# Patient Record
(demographics unavailable — no encounter records)

---

## 2024-10-08 NOTE — PHYSICAL EXAM
[Fully active, able to carry on all pre-disease performance without restriction] : Status 0 - Fully active, able to carry on all pre-disease performance without restriction [Normal] : affect appropriate [de-identified] : exam deferred [de-identified] : decreased sensation to fintertips and tops of tips of toes

## 2024-10-08 NOTE — PHYSICAL EXAM
[Fully active, able to carry on all pre-disease performance without restriction] : Status 0 - Fully active, able to carry on all pre-disease performance without restriction [Normal] : affect appropriate [de-identified] : exam deferred [de-identified] : decreased sensation to fintertips and tops of tips of toes

## 2024-10-08 NOTE — ASSESSMENT
[FreeTextEntry1] : #Breast Cancer  - IDC - ER positive at 60%, DE positive at 80%, HER2 negative by IHC with a Ki-67 of 30%.  - Reviewed the diagnosis, prognosis and natural history of ER/DE+, HER2 - IDC - Reviewed the imaging and path - Reviewed the NCCN guidelines and patient expresses understanding - Oncotype 21 - Since she is < 50  discussed that there is a ~6.5% benefit for RS 21-25. She wishes to proceed with chemotherapy - Discussed dd AC-->T vs TC - Plan for AC-->T - Echo -Left ventricular cavity is normal in size. Left ventricular wall thickness is normal. Left ventricular systolic function is normal with an ejection fraction visually estimated at 55 to 60 %. There are no regional wall motion abnormalities seen. - vs and labs reviewed. labs drawn in office today. cbc wnl, cmet wnl. ok to proceed with cycle 1 of AC with onpro she is going to do cold capping which she has.  EMLA and zofran ordered PRN - 6/12/24 vs and CBC reviewed; WBC 10.18, hgb 14.4, plt 238, ANC wnl. LFTs wnl. s/p C1 ddAC. Tolerated overall well. Proceed with C2 today  - 6/26/24 vs and CBC reviewed; WBC 15.89, hgb 12.5, plt 211. s/p c2 ddAC, proceed with C3 today. Of note MammaPrint returned as  high risk 1 luminal type B breast cancer. This goes along with her Oncotype recurrence score of 21.  - 7/10/24 - vs and labs reviewed. labs drawn in office today. wbc, hgb and plts wnl. kidney function, liver function, electrolytes. proceed with ddAC - 4th cycle. to start taxol in 2 weeks. steroids ordered. continue with dignicaps - 7/24/24- vs and labs reviewed. labs drawn in office today. wbc 20, hgb and plts wnl. wbc elevated likely due to steroids. monitor. elevated sugars due to steroids - advised to let endo know. ok to proceed with cycle 1 of taxol. - 7/31/24 vs and CBC reviewed; WBC 7.07, hgb 12.1, plt 281 ANC wnl. ALT 34. s/p C1 weekly taxol. Proceed with C2 today. Glucose and WBC more controlled. IF she tolerates this cycle without reaction consider lowering dex to 2 tabs night before.  - 8/7/24 vs and CBC reviewed; WBC 6.25 hgb 12.2, plt 353. LFTs wnl. Mg wnl. Proceed with C3 weekly taxol today. Will lower next dose night before dex to 2 tabs since she is tolerating well and did not have rxn  - 8/14/24 vs and labs reviewed; Patient went directly to infusion and treatment was started without seeing provider. Reviewed labs and were okay to proceed with tx. Discussed with Infusion RN Latonia and Ann Infusion NP the flow of this. Discussed with patient that she needs to see provider prior to start of treatment. +myalgias and pain worse in the back numbness to legs no saddle anesthesia dificulty walking. She got C4 taxol today. Will get spinal imaging. MR [facility]UCEDO spine ordered.  - 8/21/24 vs and labs reviewed; WBC 7.69, hgb 13.2, plt 324 ANC 7.09. Proceed with C5 today. LFTs wnl. MR L spine for back pain and myalgias (worse to back) did not do yet. Expedited auth request sent to auth team  8/28/24 - vs and labs reviewed. wbc, hgb and plts wnl. proceed with C6 today, pending MRI L spine report - had it done yesterday. Will need to meet with Rad onc as she is finishing chemo - 9/25/24 vs and labs reviewed; See chart notes. arabella has been going directly to infusin to start tx prior to seeing us. Discussed with patient and infusion managers. Seen in office today. C10 weekly taxol. +neuropathy. I had a lengthy discussion with patient regarding cumulative effect of chemo induced neuropathy. she is currently on gabapentin and percocet (for back pain). She states the neuropathy is the same and she wants to continue. I reviewed with her grading of neuropathy and if we need to hold tx or last 2 cycles. She states this is okay today and wishes to continue. Will proceed with caution. Patient did not want to increase gabapentin. Can try cymbalta as well. Cold soaks. Tahmina cold gloves and socks. Rad onc appt 10/2  #Neuropathy  - Bought socks and gloves (cold) to wear during tx  - Cold soaks  - Tolerable and mild right now but if worsening discussed gabapentin in future. Wants to try. Will start at night 2 tabs as this is worse at night for her  - 8/14/24 Gabapentin 200mg in am afternoon and night, tylenol didnot work will try tramadol and get spinal imaging as above  - 8/21/24 gabapentin has been incrased to 200mg TID. Tylenol no relief. Added Tramadol. States she gets more relief with Gabapentin  8/28/24 - increased gabapentin to 300 mg TID. If needed can add cymbalta  #DM - Metformin - Ozempic - Has not been taking steroids night before   #HTN - Olmesartan, hctz, amlodipine, metoprolol - Monitor at home  #vit D deficiency - vit D 49100 IU  #Nausea  - Compazine prn   RTC in 1 weeks with cbc with diff, cmp, LDH, ESR, CRP with C11   case and management discussed with Dr. Sanches

## 2024-10-08 NOTE — HISTORY OF PRESENT ILLNESS
[de-identified] : Ms. Mak is a 45-year-old premenopausal female that presents for initial consultation for newly diagnosed DCIS.   Oncological History:   3/1/24 s/p screening mammography and ultrasound at [facility] which showed a new right breast 2:00 density measuring about 1.4 cm and 10 cm from the nipple. There are also some benign-appearing cyst seen in the left breast 10:00 region.  3/18/24 s/p diagnostic imaging and then a right breast ultrasound-guided core biopsy was performed which showed an intermediate grade invasive duct cancer which was ER positive at 60%, MD positive at 80%, HER2 negative by IHC with a Ki-67 of 30%.   [facility] breast, ovarian, colon cancer panel was negative for any mutation.   24 s/p bilateral breast MRI at [facility] showing the localized cancer in the right breast 2:00 region 9 cm from the nipple with some surrounding non-mass like enhancement spanning 2.2 cm in the AP dimension.   24 s/p right breast partial mastectomy with localization device placement and sentinel lymph node biopsy. The final pathology showed a 1.8 cm high-grade invasive duct cancer which remained ER/MD positive HER2/juan negative with a Ki-67 between 40 and 50% and she had 2 negative sentinel lymph nodes and free margins making this a pathologic prognostic stage IA breast cancer.  Oncotype pending  Breast Cancer Risk Factors: Menarche: 9 Date of LMP: has not had menses in 15 y ears bc of hormonal IUD  Menopause:   Age at first live birth: 27 Nursed: yes Hysterectomy: no  Oophorectomy: no OCP:  She does have an IUD in place hormonal getting switched to copper next month with Dr. Arriaga HRT: no Related family history   She has a family history of breast cancer with her maternal grandmother who had breast cancer in her 50s and then a recurrence in her 70s. Her paternal grandmother had breast cancer in her 80s and also had lung cancer. She has a maternal aunt who had breast cancer in her 50s had and then cervical clinical cancer in her 60s. She has a paternal aunt with breast cancer at age 45. Ashkenazi: no BRCA testing: negative   Former smoker wuit 10 years ago smoked for 10 years     [de-identified] : Patient seen and examined in infusion today for follow up. s/p C4 ddAC. Due for C10 weekly taxol. s/p MRI back negative. Numb to fingertips and toes. Wants to finish. +gabapentin and percocet. Doing quynh cap.Rad onc appt 10/2

## 2024-10-08 NOTE — REVIEW OF SYSTEMS
[Fever] : no fever [Chills] : no chills [Night Sweats] : no night sweats [Fatigue] : fatigue [Recent Change In Weight] : ~T no recent weight change [Diarrhea: Grade 1 - Increase of <4 stools per day over baseline; mild increase in ostomy output compared to baseline] : Diarrhea: Grade 1 - Increase of <4 stools per day over baseline; mild increase in ostomy output compared to baseline [Joint Pain] : joint pain [Confused] : no confusion [Dizziness] : no dizziness [Difficulty Walking] : difficulty walking [Negative] : Allergic/Immunologic [FreeTextEntry2] : weight stable  [de-identified] : 1 episode [de-identified] : +neuropathy

## 2024-10-08 NOTE — ASSESSMENT
[FreeTextEntry1] : #Breast Cancer  - IDC - ER positive at 60%, KY positive at 80%, HER2 negative by IHC with a Ki-67 of 30%.  - Reviewed the diagnosis, prognosis and natural history of ER/KY+, HER2 - IDC - Reviewed the imaging and path - Reviewed the NCCN guidelines and patient expresses understanding - Oncotype 21 - Since she is < 50  discussed that there is a ~6.5% benefit for RS 21-25. She wishes to proceed with chemotherapy - Discussed dd AC-->T vs TC - Plan for AC-->T - Echo -Left ventricular cavity is normal in size. Left ventricular wall thickness is normal. Left ventricular systolic function is normal with an ejection fraction visually estimated at 55 to 60 %. There are no regional wall motion abnormalities seen. - vs and labs reviewed. labs drawn in office today. cbc wnl, cmet wnl. ok to proceed with cycle 1 of AC with onpro she is going to do cold capping which she has.  EMLA and zofran ordered PRN - 6/12/24 vs and CBC reviewed; WBC 10.18, hgb 14.4, plt 238, ANC wnl. LFTs wnl. s/p C1 ddAC. Tolerated overall well. Proceed with C2 today  - 6/26/24 vs and CBC reviewed; WBC 15.89, hgb 12.5, plt 211. s/p c2 ddAC, proceed with C3 today. Of note MammaPrint returned as  high risk 1 luminal type B breast cancer. This goes along with her Oncotype recurrence score of 21.  - 7/10/24 - vs and labs reviewed. labs drawn in office today. wbc, hgb and plts wnl. kidney function, liver function, electrolytes. proceed with ddAC - 4th cycle. to start taxol in 2 weeks. steroids ordered. continue with dignicaps - 7/24/24- vs and labs reviewed. labs drawn in office today. wbc 20, hgb and plts wnl. wbc elevated likely due to steroids. monitor. elevated sugars due to steroids - advised to let endo know. ok to proceed with cycle 1 of taxol. - 7/31/24 vs and CBC reviewed; WBC 7.07, hgb 12.1, plt 281 ANC wnl. ALT 34. s/p C1 weekly taxol. Proceed with C2 today. Glucose and WBC more controlled. IF she tolerates this cycle without reaction consider lowering dex to 2 tabs night before.  - 8/7/24 vs and CBC reviewed; WBC 6.25 hgb 12.2, plt 353. LFTs wnl. Mg wnl. Proceed with C3 weekly taxol today. Will lower next dose night before dex to 2 tabs since she is tolerating well and did not have rxn  - 8/14/24 vs and labs reviewed; Patient went directly to infusion and treatment was started without seeing provider. Reviewed labs and were okay to proceed with tx. Discussed with Infusion RN Latonia and Ann Infusion NP the flow of this. Discussed with patient that she needs to see provider prior to start of treatment. +myalgias and pain worse in the back numbness to legs no saddle anesthesia dificulty walking. She got C4 taxol today. Will get spinal imaging. MR [facility] spine ordered.  - 8/21/24 vs and labs reviewed; WBC 7.69, hgb 13.2, plt 324 ANC 7.09. Proceed with C5 today. LFTs wnl. MR L spine for back pain and myalgias (worse to back) did not do yet. Expedited auth request sent to auth team  8/28/24 - vs and labs reviewed. wbc, hgb and plts wnl. proceed with C6 today, pending MRI L spine report - had it done yesterday. Will need to meet with Rad onc as she is finishing chemo - 9/25/24 vs and labs reviewed; See chart notes. arabella has been going directly to infusin to start tx prior to seeing us. Discussed with patient and infusion managers. Seen in office today. C10 weekly taxol. +neuropathy. I had a lengthy discussion with patient regarding cumulative effect of chemo induced neuropathy. she is currently on gabapentin and percocet (for back pain). She states the neuropathy is the same and she wants to continue. I reviewed with her grading of neuropathy and if we need to hold tx or last 2 cycles. She states this is okay today and wishes to continue. Will proceed with caution. Patient did not want to increase gabapentin. Can try cymbalta as well. Cold soaks. [place]mina cold gloves and socks. Rad onc appt 10/2  #Neuropathy  - Bought socks and gloves (cold) to wear during tx  - Cold soaks  - Tolerable and mild right now but if worsening discussed gabapentin in future. Wants to try. Will start at night 2 tabs as this is worse at night for her  - 8/14/24 Gabapentin 200mg in am afternoon and night, tylenol didnot work will try tramadol and get spinal imaging as above  - 8/21/24 gabapentin has been incrased to 200mg TID. Tylenol no relief. Added Tramadol. States she gets more relief with Gabapentin  8/28/24 - increased gabapentin to 300 mg TID. If needed can add cymbalta  #DM - Metformin - Ozempic - Has not been taking steroids night before   #HTN - Olmesartan, hctz, amlodipine, metoprolol - Monitor at home  #vit D deficiency - vit D 56192 IU  #Nausea  - Compazine prn   RTC in 1 weeks with cbc with diff, cmp, LDH, ESR, CRP with C11   case and management discussed with Dr. Sanches

## 2024-10-08 NOTE — REVIEW OF SYSTEMS
[Fever] : no fever [Chills] : no chills [Night Sweats] : no night sweats [Fatigue] : fatigue [Recent Change In Weight] : ~T no recent weight change [Diarrhea: Grade 1 - Increase of <4 stools per day over baseline; mild increase in ostomy output compared to baseline] : Diarrhea: Grade 1 - Increase of <4 stools per day over baseline; mild increase in ostomy output compared to baseline [Joint Pain] : joint pain [Confused] : no confusion [Dizziness] : no dizziness [Difficulty Walking] : difficulty walking [Negative] : Allergic/Immunologic [FreeTextEntry2] : weight stable  [de-identified] : 1 episode [de-identified] : +neuropathy

## 2024-10-08 NOTE — HISTORY OF PRESENT ILLNESS
[de-identified] : Ms. Mak is a 45-year-old premenopausal female that presents for initial consultation for newly diagnosed DCIS.   Oncological History:   3/1/24 s/p screening mammography and ultrasound at [facility] which showed a new right breast 2:00 density measuring about 1.4 cm and 10 cm from the nipple. There are also some benign-appearing cyst seen in the left breast 10:00 region.  3/18/24 s/p diagnostic imaging and then a right breast ultrasound-guided core biopsy was performed which showed an intermediate grade invasive duct cancer which was ER positive at 60%, [place] positive at 80%, HER2 negative by IHC with a Ki-67 of 30%.   [employer] breast, ovarian, colon cancer panel was negative for any mutation.   24 s/p bilateral breast MRI at [facility] showing the localized cancer in the right breast 2:00 region 9 cm from the nipple with some surrounding non-mass like enhancement spanning 2.2 cm in the AP dimension.   24 s/p right breast partial mastectomy with localization device placement and sentinel lymph node biopsy. The final pathology showed a 1.8 cm high-grade invasive duct cancer which remained ER/OH positive HER2/juan negative with a Ki-67 between 40 and 50% and she had 2 negative sentinel lymph nodes and free margins making this a pathologic prognostic stage IA breast cancer.  Oncotype pending  Breast Cancer Risk Factors: Menarche: 9 Date of LMP: has not had menses in 15 y ears bc of hormonal IUD  Menopause:   Age at first live birth: 27 Nursed: yes Hysterectomy: no  Oophorectomy: no OCP:  She does have an IUD in place hormonal getting switched to copper next month with Dr. Arriaga HRT: no Related family history   She has a family history of breast cancer with her maternal grandmother who had breast cancer in her 50s and then a recurrence in her 70s. Her paternal grandmother had breast cancer in her 80s and also had lung cancer. She has a maternal aunt who had breast cancer in her 50s had and then cervical clinical cancer in her 60s. She has a paternal aunt with breast cancer at age 45. Ashkenazi: no BRCA testing: negative   Former smoker wuit 10 years ago smoked for 10 years     [de-identified] : Patient seen and examined in infusion today for follow up. s/p C4 ddAC. Due for C10 weekly taxol. s/p MRI back negative. Numb to fingertips and toes. Wants to finish. +gabapentin and percocet. Doing quynh cap.Rad onc appt 10/2

## 2024-10-29 NOTE — DISEASE MANAGEMENT
[Pathological] : TNM Stage: p [IA] : IA [FreeTextEntry4] : Invasive Ductal Carcinoma of the right breast, ER/MO+ [TTNM] : 1c [NTNM] : 0 [MTNM] : 0 [de-identified] : 795 cGy [de-identified] : 8890 cGy [de-identified] : right breast

## 2024-10-29 NOTE — HISTORY OF PRESENT ILLNESS
[FreeTextEntry1] : Ms. Mak is a 45-year-old female, diagnosed with ER/IL+ Stage IA T1cN0 invasive ductal carcinoma of the right breast, status post right breast partial mastectomy and sentinel lymph node biopsy, s/p adjuvant chemotherapy.   She is status post fraction 3 / 16 of radiation to the right breast. She has very mild erythema to the treated area. She is using moisturizer to the treated area. Overall, she is tolerating treatment well.

## 2024-10-29 NOTE — PHYSICAL EXAM
[Normal] : no joint swelling, no clubbing or cyanosis of the fingernails and muscle strength and tone were normal [de-identified] : s/p right partial mastectomy

## 2024-11-07 NOTE — DISEASE MANAGEMENT
[Pathological] : TNM Stage: p [IA] : IA [FreeTextEntry4] : Invasive Ductal Carcinoma of the right breast, ER/AZ+ [TTNM] : 1c [NTNM] : 0 [MTNM] : 0 [de-identified] : 2120 cGy [de-identified] : 7566 cGy [de-identified] : right breast

## 2024-11-07 NOTE — HISTORY OF PRESENT ILLNESS
[FreeTextEntry1] : Ms. Mak is a 45-year-old female, diagnosed with ER/AR+ Stage IA T1cN0 invasive ductal carcinoma of the right breast, status post right breast partial mastectomy and sentinel lymph node biopsy, s/p adjuvant chemotherapy.   She is status post fraction 8 / 16 of radiation to the right breast. Ms. Mak is tolerating treatment well with minimal side effects. She had no new questions or concerns.

## 2024-11-07 NOTE — PHYSICAL EXAM
[Normal] : no joint swelling, no clubbing or cyanosis of the fingernails and muscle strength and tone were normal [de-identified] : s/p right partial mastectomy, minimal hyperpigmentation of the right breast

## 2024-11-07 NOTE — PHYSICAL EXAM
[Normal] : no joint swelling, no clubbing or cyanosis of the fingernails and muscle strength and tone were normal [de-identified] : s/p right partial mastectomy, minimal hyperpigmentation of the right breast

## 2024-11-07 NOTE — DISEASE MANAGEMENT
[Pathological] : TNM Stage: p [IA] : IA [FreeTextEntry4] : Invasive Ductal Carcinoma of the right breast, ER/AL+ [TTNM] : 1c [NTNM] : 0 [MTNM] : 0 [de-identified] : 2120 cGy [de-identified] : 4734 cGy [de-identified] : right breast

## 2024-11-07 NOTE — HISTORY OF PRESENT ILLNESS
[FreeTextEntry1] : Ms. Mak is a 45-year-old female, diagnosed with ER/RI+ Stage IA T1cN0 invasive ductal carcinoma of the right breast, status post right breast partial mastectomy and sentinel lymph node biopsy, s/p adjuvant chemotherapy.   She is status post fraction 8 / 16 of radiation to the right breast. Ms. Mak is tolerating treatment well with minimal side effects. She had no new questions or concerns.

## 2024-11-14 NOTE — DISEASE MANAGEMENT
[Pathological] : TNM Stage: p [FreeTextEntry4] : Invasive Ductal Carcinoma of the right breast, ER/VA+ [TTNM] : 1c [NTNM] : 0 [MTNM] : 0 [IA] : IA [de-identified] : cGy [de-identified] : 9283 cGy [de-identified] : right breast

## 2024-11-14 NOTE — HISTORY OF PRESENT ILLNESS
[FreeTextEntry1] : Ms. Mak is a 45-year-old female, diagnosed with ER/WA+ Stage IA T1cN0 invasive ductal carcinoma of the right breast, status post right breast partial mastectomy and sentinel lymph node biopsy, s/p adjuvant chemotherapy.   She is status post fraction 13 / 16 of radiation to the right breast. Ms. Mak is tolerating treatment well. She has mild radiation dermatitis that is pruritic. She is using moisturizer and hydrocortisone.  She has grade 1 fatigue. She is looking forward to completion.

## 2024-11-14 NOTE — PHYSICAL EXAM
[Normal] : no joint swelling, no clubbing or cyanosis of the fingernails and muscle strength and tone were normal [de-identified] : s/p right partial mastectomy, minimal hyperpigmentation of the right breast

## 2024-11-19 NOTE — HISTORY OF PRESENT ILLNESS
[FreeTextEntry1] : Ms. Mak is a 45-year-old female, diagnosed with ER/WI+ Stage IA T1cN0 invasive ductal carcinoma of the right breast, status post right breast partial mastectomy and sentinel lymph node biopsy, s/p adjuvant chemotherapy.   She is status post fraction 18 / 20 of radiation to the right breast + boost. Ms. Mak offers complaints of swelling, redness, warmth to the treated right breast. She has grade 1 desquamation to the breast fold. She reports the medial chest is pruritic and continues to use hydrocortisone.  She has grade 1 fatigue. She is scheduled to complete treatment on Thursday.

## 2024-11-19 NOTE — DISEASE MANAGEMENT
[Pathological] : TNM Stage: p [IA] : IA [FreeTextEntry4] : Invasive Ductal Carcinoma of the right breast, ER/IA+ [TTNM] : 1c [NTNM] : 0 [MTNM] : 0 [de-identified] : 2062 cGy [de-identified] : 5240 cGy [de-identified] : right breast

## 2024-11-19 NOTE — PHYSICAL EXAM
[Normal] : no joint swelling, no clubbing or cyanosis of the fingernails and muscle strength and tone were normal [de-identified] : s/p right partial mastectomy, minimal hyperpigmentation of the right breast

## 2024-11-19 NOTE — DISEASE MANAGEMENT
[Pathological] : TNM Stage: p [IA] : IA [FreeTextEntry4] : Invasive Ductal Carcinoma of the right breast, ER/TX+ [TTNM] : 1c [NTNM] : 0 [MTNM] : 0 [de-identified] : 8803 cGy [de-identified] : 5240 cGy [de-identified] : right breast

## 2024-11-19 NOTE — PHYSICAL EXAM
[Normal] : no joint swelling, no clubbing or cyanosis of the fingernails and muscle strength and tone were normal [de-identified] : s/p right partial mastectomy, minimal hyperpigmentation of the right breast

## 2024-11-19 NOTE — HISTORY OF PRESENT ILLNESS
[FreeTextEntry1] : Ms. Mak is a 45-year-old female, diagnosed with ER/AR+ Stage IA T1cN0 invasive ductal carcinoma of the right breast, status post right breast partial mastectomy and sentinel lymph node biopsy, s/p adjuvant chemotherapy.   She is status post fraction 18 / 20 of radiation to the right breast + boost. Ms. Mak offers complaints of swelling, redness, warmth to the treated right breast. She has grade 1 desquamation to the breast fold. She reports the medial chest is pruritic and continues to use hydrocortisone.  She has grade 1 fatigue. She is scheduled to complete treatment on Thursday.

## 2024-12-08 NOTE — HISTORY OF PRESENT ILLNESS
[de-identified] : Ms. Mak is a 45-year-old premenopausal female that presents for initial consultation for newly diagnosed DCIS.   Oncological History:   3/1/24 s/p screening mammography and ultrasound at [facility] which showed a new right breast 2:00 density measuring about 1.4 cm and 10 cm from the nipple. There are also some benign-appearing cyst seen in the left breast 10:00 region.  3/18/24 s/p diagnostic imaging and then a right breast ultrasound-guided core biopsy was performed which showed an intermediate grade invasive duct cancer which was ER positive at 60%, RI positive at 80%, HER2 negative by IHC with a Ki-67 of 30%.   [facility] [employer] breast, ovarian, colon cancer panel was negative for any mutation.   24 s/p bilateral breast MRI at [facility] showing the localized cancer in the right breast 2:00 region 9 cm from the nipple with some surrounding non-mass like enhancement spanning 2.2 cm in the AP dimension.   24 s/p right breast partial mastectomy with localization device placement and sentinel lymph node biopsy. The final pathology showed a 1.8 cm high-grade invasive duct cancer which remained ER/RI positive HER2/juan negative with a Ki-67 between 40 and 50% and she had 2 negative sentinel lymph nodes and free margins making this a pathologic prognostic stage IA breast cancer.  Oncotype pending  Breast Cancer Risk Factors: Menarche: 9 Date of LMP: has not had menses in 15 y ears bc of hormonal IUD  Menopause:   Age at first live birth: 27 Nursed: yes Hysterectomy: no  Oophorectomy: no OCP:  She does have an IUD in place hormonal getting switched to copper next month with Dr. Arriaga HRT: no Related family history   She has a family history of breast cancer with her maternal grandmother who had breast cancer in her 50s and then a recurrence in her 70s. Her paternal grandmother had breast cancer in her 80s and also had lung cancer. She has a maternal aunt who had breast cancer in her 50s had and then cervical clinical cancer in her 60s. She has a paternal aunt with breast cancer at age 45. Ashkenazi: no BRCA testing: negative   Former smoker wuit 10 years ago smoked for 10 years     [de-identified] : Patient seen and examined in infusion today for follow up. s/p C4 ddAC. and completion of taxol held last two doses due to significant neuropathy and myalgias. She is now taking gabapentin and cymbalta has not noticed a large difference. s/p RT with R breast skin rxn.

## 2024-12-08 NOTE — PHYSICAL EXAM
[de-identified] : decreased sensation to fintertips and tops of tips of toes [de-identified] : R breast significant erythema nd moist desquamation ot breast folds and under axilla

## 2024-12-08 NOTE — ASSESSMENT
[FreeTextEntry1] : #Breast Cancer  - IDC - ER positive at 60%, IL positive at 80%, HER2 negative by IHC with a Ki-67 of 30%.  - Reviewed the diagnosis, prognosis and natural history of ER/IL+, HER2 - IDC - Reviewed the imaging and path - Reviewed the NCCN guidelines and patient expresses understanding - Oncotype 21 - Since she is < 50  discussed that there is a ~6.5% benefit for RS 21-25. She wishes to proceed with chemotherapy - Discussed dd AC-->T vs TC - Plan for AC-->T - Echo -Left ventricular cavity is normal in size. Left ventricular wall thickness is normal. Left ventricular systolic function is normal with an ejection fraction visually estimated at 55 to 60 %. There are no regional wall motion abnormalities seen. - vs and labs reviewed. labs drawn in office today. cbc wnl, cmet wnl. ok to proceed with cycle 1 of AC with onpro she is going to do cold capping which she has.  EMLA and zofran ordered PRN - 6/12/24 vs and CBC reviewed; WBC 10.18, hgb 14.4, plt 238, ANC wnl. LFTs wnl. s/p C1 ddAC. Tolerated overall well. Proceed with C2 today  - 6/26/24 vs and CBC reviewed; WBC 15.89, hgb 12.5, plt 211. s/p c2 ddAC, proceed with C3 today. Of note MammaPrint returned as  high risk 1 luminal type B breast cancer. This goes along with her Oncotype recurrence score of 21.  - 7/10/24 - vs and labs reviewed. labs drawn in office today. wbc, hgb and plts wnl. kidney function, liver function, electrolytes. proceed with ddAC - 4th cycle. to start taxol in 2 weeks. steroids ordered. continue with dignicaps - 7/24/24- vs and labs reviewed. labs drawn in office today. wbc 20, hgb and plts wnl. wbc elevated likely due to steroids. monitor. elevated sugars due to steroids - advised to let endo know. ok to proceed with cycle 1 of taxol. - 7/31/24 vs and CBC reviewed; WBC 7.07, hgb 12.1, plt 281 ANC wnl. ALT 34. s/p C1 weekly taxol. Proceed with C2 today. Glucose and WBC more controlled. IF she tolerates this cycle without reaction consider lowering dex to 2 tabs night before.  - 8/7/24 vs and CBC reviewed; WBC 6.25 hgb 12.2, plt 353. LFTs wnl. Mg wnl. Proceed with C3 weekly taxol today. Will lower next dose night before dex to 2 tabs since she is tolerating well and did not have rxn  - 8/14/24 vs and labs reviewed; Patient went directly to infusion and treatment was started without seeing provider. Reviewed labs and were okay to proceed with tx. Discussed with Infusion RN Latonia and Ann Infusion NP the flow of this. Discussed with patient that she needs to see provider prior to start of treatment. +myalgias and pain worse in the back numbness to legs no saddle anesthesia dificulty walking. She got C4 taxol today. Will get spinal imaging. MR SAUCEDO spine ordered.  - 8/21/24 vs and labs reviewed; WBC 7.69, hgb 13.2, plt 324 ANC 7.09. Proceed with C5 today. LFTs wnl. MR L spine for back pain and myalgias (worse to back) did not do yet. Expedited auth request sent to auth team  8/28/24 - vs and labs reviewed. wbc, hgb and plts wnl. proceed with C6 today, pending MRI L spine report - had it done yesterday. Will need to meet with Rad onc as she is finishing chemo - 9/25/24 vs and labs reviewed; See chart notes. arabella has been going directly to infusin to start tx prior to seeing us. Discussed with patient and infusion managers. Seen in office today. C10 weekly taxol. +neuropathy. I had a lengthy discussion with patient regarding cumulative effect of chemo induced neuropathy. she is currently on gabapentin and percocet (for back pain). She states the neuropathy is the same and she wants to continue. I reviewed with her grading of neuropathy and if we need to hold tx or last 2 cycles. She states this is okay today and wishes to continue. Will proceed with caution. Patient did not want to increase gabapentin. Can try cymbalta as well. Cold soaks. Tahmina cold gloves and socks. Rad onc appt 10/2 - 11/2024 vs and labs reviewed; now s/p completion of RT. Held last two cycles taxol patient still has significant myalgias and neuroapthy. Increase cymbalta to 60mg daily. Discussed hormone receptor positive and endocrine therapy. Unclear if menopausal status had not had  menses in years due to IUD which was removed before surgery. check hormones today. Reviwed role of AI vs tamoxifen vs AI/tamoxifen with OFS. Discussed with dr. sanches candidate for OFS given high risk mammaprint. Reviewed with patient role of tamoxifen alone vs with OFS she wishes to proceed with OFS. Will check hormones first and determine if OFS is needed. Would received Zoladex 3.6mg monthly. will get auth. Also advised to scheduled baseline dexa if we switch to AI in future  Monitor hormones monthly.   #Neuropathy  - Bought socks and gloves (cold) to wear during tx  - Cold soaks  - Tolerable and mild right now but if worsening discussed gabapentin in future. Wants to try. Will start at night 2 tabs as this is worse at night for her  - 8/14/24 Gabapentin 200mg in am afternoon and night, tylenol didnot work will try tramadol and get spinal imaging as above  - 8/21/24 gabapentin has been incrased to 200mg TID. Tylenol no relief. Added Tramadol. States she gets more relief with Gabapentin  8/28/24 - increased gabapentin to 300 mg TID. If needed can add cymbalta - 11/2024 last two cycles of taxol held due to significant neuropathy and mylagias, still present. Added cymbalta will increase to 60mg   #DM - Metformin - Ozempic - Has not been taking steroids night before   #HTN - Olmesartan, hctz, amlodipine, metoprolol - Monitor at home  #vit D deficiency - Monitor levels   #Nausea  - Compazine prn   RTC in 1 mos with cbc with diff, cmp, LDH, ESR, CRP hormones   case and management discussed with Dr. Sanches

## 2024-12-08 NOTE — REVIEW OF SYSTEMS
[Fever] : no fever [Chills] : no chills [Night Sweats] : no night sweats [Recent Change In Weight] : ~T no recent weight change [Muscle Pain] : muscle pain [Confused] : no confusion [Dizziness] : no dizziness [FreeTextEntry2] : weight stable  [de-identified] : 1 episode [de-identified] : +neuropathy

## 2024-12-08 NOTE — PHYSICAL EXAM
[de-identified] : R breast significant erythema nd moist desquamation ot breast folds and under axilla  [de-identified] : decreased sensation to fintertips and tops of tips of toes

## 2024-12-08 NOTE — ASSESSMENT
[FreeTextEntry1] : #Breast Cancer  - IDC - ER positive at 60%, LA positive at 80%, HER2 negative by IHC with a Ki-67 of 30%.  - Reviewed the diagnosis, prognosis and natural history of ER/LA+, HER2 - IDC - Reviewed the imaging and path - Reviewed the NCCN guidelines and patient expresses understanding - Oncotype 21 - Since she is < 50  discussed that there is a ~6.5% benefit for RS 21-25. She wishes to proceed with chemotherapy - Discussed dd AC-->T vs TC - Plan for AC-->T - Echo -Left ventricular cavity is normal in size. Left ventricular wall thickness is normal. Left ventricular systolic function is normal with an ejection fraction visually estimated at 55 to 60 %. There are no regional wall motion abnormalities seen. - vs and labs reviewed. labs drawn in office today. cbc wnl, cmet wnl. ok to proceed with cycle 1 of AC with onpro she is going to do cold capping which she has.  EMLA and zofran ordered PRN - 6/12/24 vs and CBC reviewed; WBC 10.18, hgb 14.4, plt 238, ANC wnl. LFTs wnl. s/p C1 ddAC. Tolerated overall well. Proceed with C2 today  - 6/26/24 vs and CBC reviewed; WBC 15.89, hgb 12.5, plt 211. s/p c2 ddAC, proceed with C3 today. Of note MammaPrint returned as  high risk 1 luminal type B breast cancer. This goes along with her Oncotype recurrence score of 21.  - 7/10/24 - vs and labs reviewed. labs drawn in office today. wbc, hgb and plts wnl. kidney function, liver function, electrolytes. proceed with ddAC - 4th cycle. to start taxol in 2 weeks. steroids ordered. continue with dignicaps - 7/24/24- vs and labs reviewed. labs drawn in office today. wbc 20, hgb and plts wnl. wbc elevated likely due to steroids. monitor. elevated sugars due to steroids - advised to let endo know. ok to proceed with cycle 1 of taxol. - 7/31/24 vs and CBC reviewed; WBC 7.07, hgb 12.1, plt 281 ANC wnl. ALT 34. s/p C1 weekly taxol. Proceed with C2 today. Glucose and WBC more controlled. IF she tolerates this cycle without reaction consider lowering dex to 2 tabs night before.  - 8/7/24 vs and CBC reviewed; WBC 6.25 hgb 12.2, plt 353. LFTs wnl. Mg wnl. Proceed with C3 weekly taxol today. Will lower next dose night before dex to 2 tabs since she is tolerating well and did not have rxn  - 8/14/24 vs and labs reviewed; Patient went directly to infusion and treatment was started without seeing provider. Reviewed labs and were okay to proceed with tx. Discussed with Infusion RN Latonia and Ann Infusion NP the flow of this. Discussed with patient that she needs to see provider prior to start of treatment. +myalgias and pain worse in the back numbness to legs no saddle anesthesia dificulty walking. She got C4 taxol today. Will get spinal imaging. MR [facility] spine ordered.  - 8/21/24 vs and labs reviewed; WBC 7.69, hgb 13.2, plt 324 ANC 7.09. Proceed with C5 today. LFTs wnl. MR L spine for back pain and myalgias (worse to back) did not do yet. Expedited auth request sent to auth team  8/28/24 - vs and labs reviewed. wbc, hgb and plts wnl. proceed with C6 today, pending MRI L spine report - had it done yesterday. Will need to meet with Rad onc as she is finishing chemo - 9/25/24 vs and labs reviewed; See chart notes. arabella has been going directly to infusin to start tx prior to seeing us. Discussed with patient and infusion managers. Seen in office today. C10 weekly taxol. +neuropathy. I had a lengthy discussion with patient regarding cumulative effect of chemo induced neuropathy. she is currently on gabapentin and percocet (for back pain). She states the neuropathy is the same and she wants to continue. I reviewed with her grading of neuropathy and if we need to hold tx or last 2 cycles. She states this is okay today and wishes to continue. Will proceed with caution. Patient did not want to increase gabapentin. Can try cymbalta as well. Cold soaks. Tahmina cold gloves and socks. Rad onc appt 10/2 - 11/2024 vs and labs reviewed; now s/p completion of RT. Held last two cycles taxol patient still has significant myalgias and neuroapthy. Increase cymbalta to 60mg daily. Discussed hormone receptor positive and endocrine therapy. Unclear if menopausal status had not had  menses in years due to IUD which was removed before surgery. check hormones today. Reviwed role of AI vs tamoxifen vs AI/tamoxifen with OFS. Discussed with dr. sanches candidate for OFS given high risk mammaprint. Reviewed with patient role of tamoxifen alone vs with OFS she wishes to proceed with OFS. Will check hormones first and determine if OFS is needed. Would received Zoladex 3.6mg monthly. will get auth. Also advised to scheduled baseline dexa if we switch to AI in future  Monitor hormones monthly.   #Neuropathy  - Bought socks and gloves (cold) to wear during tx  - Cold soaks  - Tolerable and mild right now but if worsening discussed gabapentin in future. Wants to try. Will start at night 2 tabs as this is worse at night for her  - 8/14/24 Gabapentin 200mg in am afternoon and night, tylenol didnot work will try tramadol and get spinal imaging as above  - 8/21/24 gabapentin has been incrased to 200mg TID. Tylenol no relief. Added Tramadol. States she gets more relief with Gabapentin  8/28/24 - increased gabapentin to 300 mg TID. If needed can add cymbalta - 11/2024 last two cycles of taxol held due to significant neuropathy and mylagias, still present. Added cymbalta will increase to 60mg   #DM - Metformin - Ozempic - Has not been taking steroids night before   #HTN - Olmesartan, hctz, amlodipine, metoprolol - Monitor at home  #vit D deficiency - Monitor levels   #Nausea  - Compazine prn   RTC in 1 mos with cbc with diff, cmp, LDH, ESR, CRP hormones   case and management discussed with Dr. Sanches

## 2024-12-08 NOTE — HISTORY OF PRESENT ILLNESS
[de-identified] : Ms. Mak is a 45-year-old premenopausal female that presents for initial consultation for newly diagnosed DCIS.   Oncological History:   3/1/24 s/p screening mammography and ultrasound at [facility] which showed a new right breast 2:00 density measuring about 1.4 cm and 10 cm from the nipple. There are also some benign-appearing cyst seen in the left breast 10:00 region.  3/18/24 s/p diagnostic imaging and then a right breast ultrasound-guided core biopsy was performed which showed an intermediate grade invasive duct cancer which was ER positive at 60%, AR positive at 80%, HER2 negative by IHC with a Ki-67 of 30%.   [facility] [employer] [facility] breast, ovarian, colon cancer panel was negative for any mutation.   24 s/p bilateral breast MRI at [facility] showing the localized cancer in the right breast 2:00 region 9 cm from the nipple with some surrounding non-mass like enhancement spanning 2.2 cm in the AP dimension.   24 s/p right breast partial mastectomy with localization device placement and sentinel lymph node biopsy. The final pathology showed a 1.8 cm high-grade invasive duct cancer which remained ER/AR positive HER2/juan negative with a Ki-67 between 40 and 50% and she had 2 negative sentinel lymph nodes and free margins making this a pathologic prognostic stage IA breast cancer.  Oncotype pending  Breast Cancer Risk Factors: Menarche: 9 Date of LMP: has not had menses in 15 y ears bc of hormonal IUD  Menopause:   Age at first live birth: 27 Nursed: yes Hysterectomy: no  Oophorectomy: no OCP:  She does have an IUD in place hormonal getting switched to copper next month with Dr. Arriaga HRT: no Related family history   She has a family history of breast cancer with her maternal grandmother who had breast cancer in her 50s and then a recurrence in her 70s. Her paternal grandmother had breast cancer in her 80s and also had lung cancer. She has a maternal aunt who had breast cancer in her 50s had and then cervical clinical cancer in her 60s. She has a paternal aunt with breast cancer at age 45. Ashkenazi: no BRCA testing: negative   Former smoker wuit 10 years ago smoked for 10 years     [de-identified] : Patient seen and examined in infusion today for follow up. s/p C4 ddAC. and completion of taxol held last two doses due to significant neuropathy and myalgias. She is now taking gabapentin and cymbalta has not noticed a large difference. s/p RT with R breast skin rxn.

## 2024-12-08 NOTE — REVIEW OF SYSTEMS
[Fever] : no fever [Chills] : no chills [Night Sweats] : no night sweats [Recent Change In Weight] : ~T no recent weight change [Muscle Pain] : muscle pain [Confused] : no confusion [Dizziness] : no dizziness [FreeTextEntry2] : weight stable  [de-identified] : 1 episode [de-identified] : +neuropathy

## 2024-12-13 NOTE — PAST MEDICAL HISTORY
[Menarche Age ____] : age at menarche was [unfilled] [Total Preg ___] : G[unfilled] [Live Births ___] : P[unfilled]  [Age At Live Birth ___] : Age at live birth: [unfilled] [de-identified] : IUD

## 2024-12-13 NOTE — ADDENDUM
[FreeTextEntry1] : I spent greater than 75% in consultation in face-to-face counseling and coordination of care in this patient with a history of a right breast cancer who underwent a partial mastectomy and is currently undergoing chemotherapy and comes in now for routine breast cancer screening/surveillance.

## 2024-12-13 NOTE — PHYSICAL EXAM
[Normocephalic] : normocephalic [Atraumatic] : atraumatic [EOMI] : extra ocular movement intact [Supple] : supple [No Supraclavicular Adenopathy] : no supraclavicular adenopathy [No Cervical Adenopathy] : no cervical adenopathy [Examined in the supine and seated position] : examined in the supine and seated position [No dominant masses] : no dominant masses in right breast  [No dominant masses] : no dominant masses left breast [No Nipple Retraction] : no left nipple retraction [No Nipple Discharge] : no left nipple discharge [Breast Mass Right Breast ___cm] : no masses [Breast Mass Left Breast ___cm] : no masses [No Axillary Lymphadenopathy] : no left axillary lymphadenopathy [No Edema] : no edema [No Rashes] : no rashes [No Ulceration] : no ulceration [Breast Nipple Inversion] : nipples not inverted [Breast Nipple Retraction] : nipples not retracted [Breast Nipple Flattening] : nipples not flattened [Breast Nipple Fissures] : nipples not fissured [Breast Abnormal Lactation (Galactorrhea)] : no galactorrhea [Breast Abnormal Secretion Bloody Fluid] : no bloody discharge [Breast Abnormal Secretion Serous Fluid] : no serous discharge [Breast Abnormal Secretion Opalescent Fluid] : no milky discharge [de-identified] : On exam, the patient has done well after her right breast partial mastectomy and sentinel lymph node biopsy.  Her wounds have healed well and she has no evidence of recurrence.  She has no axillary, supraclavicular, or cervical adenopathy. [de-identified] : Status post right breast partial mastectomy and sentinel lymph node biopsy.  Her wounds have healed well and she has no evidence of recurrence.  Right axillary hematoma has resolved.

## 2024-12-13 NOTE — ASSESSMENT
[FreeTextEntry1] : The patient is a 45-year-old  premenopausal white female of [place] descent.  She underwent menarche at age 9 and had her first child at age 28.  She does have an IUD in place.  She quit smoking in her mid 20s but does drink wine daily.  She has a family history of breast cancer with her maternal grandmother who had breast cancer in her 50s and then a recurrence in her 70s.  Her paternal grandmother had breast cancer in her 80s and also had lung cancer.  She has a maternal aunt who had breast cancer in her 50s had and then cervical clinical cancer in her 60s.  She has a paternal aunt with breast cancer at age 45.  She underwent her screening mammography and ultrasound on 2024 at [facility] which showed a new right breast 2:00 density measuring about 1.4 cm and 10 cm from the nipple.  There are also some benign-appearing cyst seen in the left breast 10:00 region.  She underwent diagnostic imaging on 2024 and then a right breast ultrasound-guided core biopsy was performed which showed an intermediate grade invasive duct cancer which was ER positive at 60%, KS positive at 80%, HER2 negative by IHC with a Ki-67 of 30%.  I reviewed her imaging on CD-ROM and indeed she had this new finding in the right breast upper inner quadrant measuring about 1.4 cm which was appropriately biopsied with a "heart" clip placed in the lesion.   [facility] breast, ovarian, colon cancer panel was negative for any mutation.  Bilateral breast MRI was performed on 2024 at [facility] showing the localized cancer in the right breast 2:00 region 9 cm from the nipple with some surrounding non-mass like enhancement spanning 2.2 cm in the AP dimension.  She was brought to the operating room on 2024 for right breast partial mastectomy with localization device placement and sentinel lymph node biopsy.  The final pathology showed a 1.8 cm high-grade invasive duct cancer which remained ER/KS positive HER2/juan negative with a Ki-67 between 40 and 50% and she had 2 negative sentinel lymph nodes and free margins making this a pathologic prognostic stage IA breast cancer.   The tumor was sent out for an Oncotype recurrence score which was 21.  MammaPrint was sent out as part of the FLEX trial and she had a high risk 1 luminal B subtype.  She did see Dr. Sanches in consultation and she had a Port-A-Cath placed and she received AC-T chemotherapy which finished in 2024.  She then received hypofractionated external beam radiation therapy through Dr. Osman which finished on 2024.   On exam today, she has healed well from her right breast partial mastectomy and she is done well after her chemotherapy and external beam radiation and has no evidence of recurrence. ???? new breast mass ??????. The patient was reassured and I would like to see her again in another 6 months for routine follow-up.  She will be due for her next bilateral mammography and ultrasound in 2025 and she was given prescriptions.

## 2024-12-13 NOTE — HISTORY OF PRESENT ILLNESS
[FreeTextEntry1] : The patient is a 45-year-old  premenopausal white female of [place] descent.  She underwent menarche at age 9 and had her first child at age 28.  She does have an IUD in place.  She quit smoking in her mid 20s but does drink wine daily.  She has a family history of breast cancer with her maternal grandmother who had breast cancer in her 50s and then a recurrence in her 70s.  Her paternal grandmother had breast cancer in her 80s and also had lung cancer.  She has a maternal aunt who had breast cancer in her 50s had and then cervical clinical cancer in her 60s.  She has a paternal aunt with breast cancer at age 45.  She underwent her screening mammography and ultrasound on 2024 at [facility] which showed a new right breast 2:00 density measuring about 1.4 cm and 10 cm from the nipple.  There are also some benign-appearing cyst seen in the left breast 10:00 region.  She underwent diagnostic imaging on 2024 and then a right breast ultrasound-guided core biopsy was performed which showed an intermediate grade invasive duct cancer which was ER positive at 60%, OR positive at 80%, HER2 negative by IHC with a Ki-67 of 30%.  She had a "heart" clip placed at the biopsy site.  [facility] breast, ovarian, colon cancer panel was negative for any mutation.  Bilateral breast MRI was performed on 2024 at [facility] showing the localized cancer in the right breast 2:00 region 9 cm from the nipple with some surrounding non-mass like enhancement spanning 2.2 cm in the AP dimension.  She was brought to the operating room on 2024 for right breast partial mastectomy with localization device placement and sentinel lymph node biopsy.  The final pathology showed a 1.8 cm high-grade invasive duct cancer which remained ER/OR positive HER2/juan negative with a Ki-67 between 40 and 50% and she had 2 negative sentinel lymph nodes and free margins making this a pathologic prognostic stage IA breast cancer.  Oncotype recurrence score was 21 and MammaPrint was sent out as part of the FLEX trial and she had a high risk 1 luminal B subtype. She was seen by Dr. Sanches and had a Port-A-Cath placed and she received AC-T chemotherapy which finished in 2024.  She then received hypofractionated external beam radiation therapy through Dr. Osman which finished on 2024. She comes in now with a questionable new breast mass.

## 2024-12-13 NOTE — REASON FOR VISIT
[Follow-Up: _____] : a [unfilled] follow-up visit [FreeTextEntry1] : The patient is a premenopausal white female of [place] descent with a strong family history of breast cancer.  She was found to have an abnormal density in the right breast 2:00 region on screening mammography and ultrasound in March 2024 and ultrasound-guided core biopsy showed a moderately differentiated invasive duct cancer which was ER/SC positive HER2 negative with a Ki-67 of 30%.  Bilateral breast MRI was performed in April 2024 at [facility] showing the cancer to be localized in the right breast 2:00 region with some surrounding non-mass like enhancement spanning over 2.2 cm.  Genetic panel testing was negative.  She underwent a right breast partial mastectomy with sentinel lymph node biopsy on April 29, 2024.  The final pathology showed a 1.8 cm high-grade invasive duct cancer with 2 negative sentinel lymph nodes which remained ER/SC positive HER2/juan negative with a Ki-67 between 40 and 50% making this a pathologic prognostic stage IA breast cancer.  Oncotype recurrence score was 21 and she was seen by Dr. Sanches and AC-T chemotherapy was started through a left-sided Port-A-Cath.  She comes in now sent by medical oncology who is feeling a breast density.

## 2024-12-20 NOTE — HISTORY OF PRESENT ILLNESS
[FreeTextEntry1] : Ms. Mak is a 45-year-old female, diagnosed with ER/CA+ Stage IA T1cN0 invasive ductal carcinoma of the right breast, status post right breast partial mastectomy and sentinel lymph node biopsy, s/p adjuvant chemotherapy referred for a consultation to discuss adjuvant radiation therapy.   Ms. Mak underwent screening mammography and ultrasound on 2024 at [facility] which revealed a new 1.3 cm hypoechoic mass with ill-defined margins for which additional spot compression views were suggested. At the 10 o'clock daphnie-areolar location, there was a well circumscribed 0.8 cm mass. On 3/18/24, she underwent right diagnostic mammogram and bilateral sonogram that revealed 1.4 cm hypoechoic mass with angular margins at 2:00 of the right breast.  Right breast ultrasound-guided core biopsy was performed on 3/18/24 and pathology revealed an intermediate grade invasive moderately differentiated ductal cancer. Biomarkers were ER positive at 60%, CA positive at 80%, HER2 negative by IHC with a Ki-67 of 30%.  Staging MRI breast (24) demonstrated enhancing mass with irregular margins corresponding to biopsy proven carcinoma.   On 2024, she underwent a right breast partial mastectomy with localization device placement and sentinel lymph node biopsy performed by Dr. Ibanez. The final pathology demonstrated a 1.8 cm high-grade invasive ductal carcinoma. There were 2 negative sentinel lymph nodes and margins were negative. Pathologic prognostic stage IA breast cancer. Oncotype recurrence score was 21. Genetic testing was negative.   She received 4 cycles of ddAC completed on 7/10/24 under the care of Dr. Sanches. She is status post cycle 10 of Taxol on 24 and she is scheduled to complete on 10/9/24.  She reports joint and back pain and neuropathy in her hands and feet. She is on Gabapentin 300 mg TID.   Ms. Mak is present today to discuss adjuvant radiation therapy.   GYN Hx:  premenopausal, menarche at age 9 and had her first child at age 28. She does have an IUD in place. She quit smoking in her mid 20s, 1 pp week for 8-9 years. She drinks wine daily.   Family Hx: She has a family history of breast cancer, her maternal grandmother who had breast cancer in her 50s and then a recurrence in her 70s. Her paternal grandmother had breast cancer in her 80s and also had lung cancer. She has a maternal aunt who had breast cancer in her 50s had and then cervical clinical cancer in her 60s.

## 2024-12-20 NOTE — PHYSICAL EXAM
[Normal] : no joint swelling, no clubbing or cyanosis of the fingernails and muscle strength and tone were normal [de-identified] : s/p right partial mastectomy

## 2024-12-20 NOTE — DISEASE MANAGEMENT
[Pathological] : TNM Stage: p [FreeTextEntry4] : Invasive Ductal Carcinoma of the right breast, ER/SD+ [TTNM] : 1c [NTNM] : 0 [MTNM] : 0 [IA] : IA

## 2024-12-20 NOTE — OB/GYN HISTORY
[History of Birth Control Pills] : Patient has a history of taking birth control pills [History of Hormone Replacement Therapy] : no history of hormone replacement therapy [___] : Living: [unfilled]

## 2025-01-03 NOTE — HISTORY OF PRESENT ILLNESS
[Home] : at home, [unfilled] , at the time of the visit. [Medical Office: ([facility])___] : at the medical office located in  [Verbal consent obtained from patient] : the patient, [unfilled] [FreeTextEntry1] :  Ms. Mak is a 45-year-old female, diagnosed with ER/AZ+ Stage IA T1cN0 invasive ductal carcinoma of the right breast, status post right breast partial mastectomy and sentinel lymph node biopsy, s/p adjuvant chemotherapy followed by radiation. She is present for her post treatment evaluation.   Ms. Mak underwent screening mammography and ultrasound on March 1, 2024 at [facility] which revealed a new 1.3 cm hypoechoic mass with ill-defined margins for which additional spot compression views were suggested. At the 10 o'clock daphnie-areolar location, there was a well circumscribed 0.8 cm mass. On 3/18/24, she underwent right diagnostic mammogram and bilateral sonogram that revealed 1.4 cm hypoechoic mass with angular margins at 2:00 of the right breast.  Right breast ultrasound-guided core biopsy was performed on 3/18/24 and pathology revealed an intermediate grade invasive moderately differentiated ductal cancer. Biomarkers were ER positive at 60%, AZ positive at 80%, HER2 negative by IHC with a Ki-67 of 30%.  Staging MRI breast (4/2/24) demonstrated enhancing mass with irregular margins corresponding to biopsy proven carcinoma.   On April 29, 2024, she underwent a right breast partial mastectomy with localization device placement and sentinel lymph node biopsy performed by Dr. Ibanez. The final pathology demonstrated a 1.8 cm high-grade invasive ductal carcinoma. There were 2 negative sentinel lymph nodes and margins were negative. Pathologic prognostic stage IA breast cancer. Oncotype recurrence score was 21. Genetic testing was negative.   She received 4 cycles of ddAC completed on 7/10/24 under the care of Dr. Sanches. She is status post cycle 10 of Taxol on 9/25/24 and she is scheduled to complete on 10/9/24.  She reports joint and back pain and neuropathy in her hands and feet. She is on Gabapentin 300 mg TID.   Ms. Mak is present today status post completion of adjuvant radiation therapy. She reports she is doing well. She reports radiation dermatitis has healed well. She has mild fatigue present. She has started Tamoxifen 1 month ago and reports hot flashes present. She has follow-up by med oncology on 2/9/25 and Dr. Ibanez in February. Overall, she is doing well with improving post treatment side effects.

## 2025-01-03 NOTE — PHYSICAL EXAM
[Normal] : well developed, well nourished, in no acute distress [Oriented To Time, Place, And Person] : oriented to person, place, and time [de-identified] : telehealth

## 2025-01-03 NOTE — DISEASE MANAGEMENT
[Pathological] : TNM Stage: p [IA] : IA [FreeTextEntry4] : Invasive Ductal Carcinoma of the right breast, ER/FL+ [TTNM] : 1c [NTNM] : 0 [MTNM] : 0

## 2025-01-13 NOTE — HISTORY OF PRESENT ILLNESS
[FreeTextEntry1] : The patient is a 45-year-old  premenopausal white female of [place] descent.  She underwent menarche at age 9 and had her first child at age 28.  She does have an IUD in place.  She quit smoking in her mid 20s but does drink wine daily.  She has a family history of breast cancer with her maternal grandmother who had breast cancer in her 50s and then a recurrence in her 70s.  Her paternal grandmother had breast cancer in her 80s and also had lung cancer.  She has a maternal aunt who had breast cancer in her 50s had and then cervical clinical cancer in her 60s.  She has a paternal aunt with breast cancer at age 45.  She underwent her screening mammography and ultrasound on 2024 at [facility] which showed a new right breast 2:00 density measuring about 1.4 cm and 10 cm from the nipple.  There are also some benign-appearing cyst seen in the left breast 10:00 region.  She underwent diagnostic imaging on 2024 and then a right breast ultrasound-guided core biopsy was performed which showed an intermediate grade invasive duct cancer which was ER positive at 60%, MT positive at 80%, HER2 negative by IHC with a Ki-67 of 30%.  She had a "heart" clip placed at the biopsy site.  [facility] breast, ovarian, colon cancer panel was negative for any mutation.  Bilateral breast MRI was performed on 2024 at [facility] showing the localized cancer in the right breast 2:00 region 9 cm from the nipple with some surrounding non-mass like enhancement spanning 2.2 cm in the AP dimension.  She was brought to the operating room on 2024 for right breast partial mastectomy with localization device placement and sentinel lymph node biopsy.  The final pathology showed a 1.8 cm high-grade invasive duct cancer which remained ER/MT positive HER2/juan negative with a Ki-67 between 40 and 50% and she had 2 negative sentinel lymph nodes and free margins making this a pathologic prognostic stage IA breast cancer.  Oncotype recurrence score was 21 and MammaPrint was sent out as part of the FLEX trial and she had a high risk 1 luminal B subtype. She was seen by Dr. Sanches and had a Port-A-Cath placed and she received AC-T chemotherapy which finished in 2024.  She then received hypofractionated external beam radiation therapy through Dr. Osman which finished on 2024. She comes in now with a questionable new breast mass.

## 2025-01-13 NOTE — REASON FOR VISIT
[Follow-Up: _____] : a [unfilled] follow-up visit [FreeTextEntry1] : The patient is a premenopausal white female of [place] descent with a strong family history of breast cancer.  She was found to have an abnormal density in the right breast 2:00 region on screening mammography and ultrasound in March 2024 and ultrasound-guided core biopsy showed a moderately differentiated invasive duct cancer which was ER/CA positive HER2 negative with a Ki-67 of 30%.  Bilateral breast MRI was performed in April 2024 at [facility] showing the cancer to be localized in the right breast 2:00 region with some surrounding non-mass like enhancement spanning over 2.2 cm.  Genetic panel testing was negative.  She underwent a right breast partial mastectomy with sentinel lymph node biopsy on April 29, 2024.  The final pathology showed a 1.8 cm high-grade invasive duct cancer with 2 negative sentinel lymph nodes which remained ER/CA positive HER2/juan negative with a Ki-67 between 40 and 50% making this a pathologic prognostic stage IA breast cancer.  Oncotype recurrence score was 21 and she was seen by Dr. Sanches and AC-T chemotherapy was given through a left-sided Port-A-Cath.  She then underwent hypofractionated external beam radiation through Dr. Osman which finished in November 2024.  She comes in now sent by medical oncology who is feeling a breast density. [FreeTextEntry2] : April 29, 2024

## 2025-01-13 NOTE — PAST MEDICAL HISTORY
[Menarche Age ____] : age at menarche was [unfilled] [Total Preg ___] : G[unfilled] [Live Births ___] : P[unfilled]  [Age At Live Birth ___] : Age at live birth: [unfilled] [de-identified] : IUD

## 2025-01-13 NOTE — ASSESSMENT
[FreeTextEntry1] : The patient is a 45-year-old  premenopausal white female of [place] descent.  She underwent menarche at age 9 and had her first child at age 28.  She does have an IUD in place.  She quit smoking in her mid 20s but does drink wine daily.  She has a family history of breast cancer with her maternal grandmother who had breast cancer in her 50s and then a recurrence in her 70s.  Her paternal grandmother had breast cancer in her 80s and also had lung cancer.  She has a maternal aunt who had breast cancer in her 50s had and then cervical clinical cancer in her 60s.  She has a paternal aunt with breast cancer at age 45.  She underwent her screening mammography and ultrasound on 2024 at [facility] which showed a new right breast 2:00 density measuring about 1.4 cm and 10 cm from the nipple.  There are also some benign-appearing cyst seen in the left breast 10:00 region.  She underwent diagnostic imaging on 2024 and then a right breast ultrasound-guided core biopsy was performed which showed an intermediate grade invasive duct cancer which was ER positive at 60%, OK positive at 80%, HER2 negative by IHC with a Ki-67 of 30%.  I reviewed her imaging on CD-ROM and indeed she had this new finding in the right breast upper inner quadrant measuring about 1.4 cm which was appropriately biopsied with a "heart" clip placed in the lesion.   [facility] breast, ovarian, colon cancer panel was negative for any mutation.  Bilateral breast MRI was performed on 2024 at [facility] showing the localized cancer in the right breast 2:00 region 9 cm from the nipple with some surrounding non-mass like enhancement spanning 2.2 cm in the AP dimension.  She was brought to the operating room on 2024 for right breast partial mastectomy with localization device placement and sentinel lymph node biopsy.  The final pathology showed a 1.8 cm high-grade invasive duct cancer which remained ER/OK positive HER2/juan negative with a Ki-67 between 40 and 50% and she had 2 negative sentinel lymph nodes and free margins making this a pathologic prognostic stage IA breast cancer.   The tumor was sent out for an Oncotype recurrence score which was 21.  MammaPrint was sent out as part of the FLEX trial and she had a high risk 1 luminal B subtype.  She did see Dr. Sanches in consultation and she had a Port-A-Cath placed and she received AC-T chemotherapy which finished in 2024.  She then received hypofractionated external beam radiation therapy through Dr. Osman which finished on 2024.   On exam today, she has healed well from her right breast partial mastectomy and she is done well after her chemotherapy and external beam radiation and has no evidence of recurrence. ???? new breast mass ??????. The patient was reassured and I would like to see her again in another 6 months for routine follow-up.  She will be due for her next bilateral mammography and ultrasound in 2025 and she was given prescriptions.

## 2025-01-13 NOTE — PHYSICAL EXAM
[Normocephalic] : normocephalic [Atraumatic] : atraumatic [EOMI] : extra ocular movement intact [Supple] : supple [No Supraclavicular Adenopathy] : no supraclavicular adenopathy [No Cervical Adenopathy] : no cervical adenopathy [Examined in the supine and seated position] : examined in the supine and seated position [No dominant masses] : no dominant masses in right breast  [No dominant masses] : no dominant masses left breast [No Nipple Retraction] : no left nipple retraction [No Nipple Discharge] : no left nipple discharge [Breast Mass Right Breast ___cm] : no masses [Breast Mass Left Breast ___cm] : no masses [No Axillary Lymphadenopathy] : no left axillary lymphadenopathy [No Edema] : no edema [No Rashes] : no rashes [No Ulceration] : no ulceration [Breast Nipple Inversion] : nipples not inverted [Breast Nipple Retraction] : nipples not retracted [Breast Nipple Flattening] : nipples not flattened [Breast Nipple Fissures] : nipples not fissured [Breast Abnormal Lactation (Galactorrhea)] : no galactorrhea [Breast Abnormal Secretion Bloody Fluid] : no bloody discharge [Breast Abnormal Secretion Serous Fluid] : no serous discharge [Breast Abnormal Secretion Opalescent Fluid] : no milky discharge [de-identified] : On exam, the patient has done well after her right breast partial mastectomy and sentinel lymph node biopsy.  Her wounds have healed well and she has no evidence of recurrence.  She has no axillary, supraclavicular, or cervical adenopathy. [de-identified] : Status post right breast partial mastectomy and sentinel lymph node biopsy.  Her wounds have healed well and she has no evidence of recurrence.  Right axillary hematoma has resolved. 24

## 2025-01-13 NOTE — PAST MEDICAL HISTORY
[Menarche Age ____] : age at menarche was [unfilled] [Total Preg ___] : G[unfilled] [Live Births ___] : P[unfilled]  [Age At Live Birth ___] : Age at live birth: [unfilled] [de-identified] : IUD

## 2025-01-13 NOTE — ASSESSMENT
[FreeTextEntry1] : The patient is a 45-year-old  premenopausal white female of [place] descent.  She underwent menarche at age 9 and had her first child at age 28.  She does have an IUD in place.  She quit smoking in her mid 20s but does drink wine daily.  She has a family history of breast cancer with her maternal grandmother who had breast cancer in her 50s and then a recurrence in her 70s.  Her paternal grandmother had breast cancer in her 80s and also had lung cancer.  She has a maternal aunt who had breast cancer in her 50s had and then cervical clinical cancer in her 60s.  She has a paternal aunt with breast cancer at age 45.  She underwent her screening mammography and ultrasound on 2024 at [facility] which showed a new right breast 2:00 density measuring about 1.4 cm and 10 cm from the nipple.  There are also some benign-appearing cyst seen in the left breast 10:00 region.  She underwent diagnostic imaging on 2024 and then a right breast ultrasound-guided core biopsy was performed which showed an intermediate grade invasive duct cancer which was ER positive at 60%, MD positive at 80%, HER2 negative by IHC with a Ki-67 of 30%.  I reviewed her imaging on CD-ROM and indeed she had this new finding in the right breast upper inner quadrant measuring about 1.4 cm which was appropriately biopsied with a "heart" clip placed in the lesion.   [facility] breast, ovarian, colon cancer panel was negative for any mutation.  Bilateral breast MRI was performed on 2024 at [facility] showing the localized cancer in the right breast 2:00 region 9 cm from the nipple with some surrounding non-mass like enhancement spanning 2.2 cm in the AP dimension.  She was brought to the operating room on 2024 for right breast partial mastectomy with localization device placement and sentinel lymph node biopsy.  The final pathology showed a 1.8 cm high-grade invasive duct cancer which remained ER/MD positive HER2/juan negative with a Ki-67 between 40 and 50% and she had 2 negative sentinel lymph nodes and free margins making this a pathologic prognostic stage IA breast cancer.   The tumor was sent out for an Oncotype recurrence score which was 21.  MammaPrint was sent out as part of the FLEX trial and she had a high risk 1 luminal B subtype.  She did see Dr. Sanches in consultation and she had a Port-A-Cath placed and she received AC-T chemotherapy which finished in 2024.  She then received hypofractionated external beam radiation therapy through Dr. Osman which finished on 2024.   On exam today, she has healed well from her right breast partial mastectomy and she is done well after her chemotherapy and external beam radiation and has no evidence of recurrence. ???? new breast mass ??????. The patient was reassured and I would like to see her again in another 6 months for routine follow-up.  She will be due for her next bilateral mammography and ultrasound in 2025 and she was given prescriptions.

## 2025-01-13 NOTE — HISTORY OF PRESENT ILLNESS
[FreeTextEntry1] : The patient is a 45-year-old  premenopausal white female of [place] descent.  She underwent menarche at age 9 and had her first child at age 28.  She does have an IUD in place.  She quit smoking in her mid 20s but does drink wine daily.  She has a family history of breast cancer with her maternal grandmother who had breast cancer in her 50s and then a recurrence in her 70s.  Her paternal grandmother had breast cancer in her 80s and also had lung cancer.  She has a maternal aunt who had breast cancer in her 50s had and then cervical clinical cancer in her 60s.  She has a paternal aunt with breast cancer at age 45.  She underwent her screening mammography and ultrasound on 2024 at [facility] which showed a new right breast 2:00 density measuring about 1.4 cm and 10 cm from the nipple.  There are also some benign-appearing cyst seen in the left breast 10:00 region.  She underwent diagnostic imaging on 2024 and then a right breast ultrasound-guided core biopsy was performed which showed an intermediate grade invasive duct cancer which was ER positive at 60%, KS positive at 80%, HER2 negative by IHC with a Ki-67 of 30%.  She had a "heart" clip placed at the biopsy site.  [facility] breast, ovarian, colon cancer panel was negative for any mutation.  Bilateral breast MRI was performed on 2024 at [facility] showing the localized cancer in the right breast 2:00 region 9 cm from the nipple with some surrounding non-mass like enhancement spanning 2.2 cm in the AP dimension.  She was brought to the operating room on 2024 for right breast partial mastectomy with localization device placement and sentinel lymph node biopsy.  The final pathology showed a 1.8 cm high-grade invasive duct cancer which remained ER/KS positive HER2/juan negative with a Ki-67 between 40 and 50% and she had 2 negative sentinel lymph nodes and free margins making this a pathologic prognostic stage IA breast cancer.  Oncotype recurrence score was 21 and MammaPrint was sent out as part of the FLEX trial and she had a high risk 1 luminal B subtype. She was seen by Dr. Sanches and had a Port-A-Cath placed and she received AC-T chemotherapy which finished in 2024.  She then received hypofractionated external beam radiation therapy through Dr. Osman which finished on 2024. She comes in now with a questionable new breast mass.

## 2025-01-28 NOTE — HISTORY OF PRESENT ILLNESS
[FreeTextEntry1] : This is a 46-year-old woman who is being seen in neurologic consultation for evaluation of new onset neck pain.  Patient describes about a month ago the onset of left anterior neck pain which radiates to the occipital region and forward into the head.  She is unable to move her neck.  The pain is worse at night.  During the day she rates it as a 5.  At night when she happens to lay on her back it can trigger the pain.  Certain positions can trigger it.  She is nauseous because of the pain.  She notes light sensitivity.  There is no sound sensitivity.  She has no diplopia or blurred vision.  Ice helps her symptoms a little bit.  Patient has some neck pain which is nonradiating.  She has a history of chronic low back pain with radiation into the legs.  This has been present for a few months and has not gotten better.  She describes a generalized weakness.  She has significant neuropathic pain.  She describes his pain as a burning stabbing sensation affecting all 4 limbs.  She feels weak.  Of note she has done chemotherapy and radiation therapy for breast cancer.  Currently there is no evidence of cancer.  She is on tamoxifen.

## 2025-01-28 NOTE — PHYSICAL EXAM
[FreeTextEntry1] : Physical examination  General: No acute distress, Awake, Alert.  Decreased range of motion with reduced motion and lateral flexion of the neck towards the left.  Significant left trapezius muscle spasm. Questionable bruit in the left carotid.  Mental status  Awake, alert, and oriented to person, time and place, Normal attention span and concentration, Recent and remote memory intact, Language intact, Fund of knowledge intact.  Cranial Nerves  II: VFF  III, IV, VI: PERRL, EOMI.  V: Facial sensation is normal B/L.  VII: Facial strength is normal B/L.  VIII: Gross hearing is intact.  IX, X: Palate is midline and elevates symmetrically.  XI: Trapezius normal strength.  XII: Tongue midline without atrophy or fasciculations.   Motor exam  Muscle tone - no evidence of rigidity or resistance in all 4 extremities.  No atrophy or fasciculations  Muscle Strength: arms and legs, proximal and distal flexors and extensors are normal  No UE drift.  Reflexes  All present, normal, and symmetrical.  Plantars right: mute.  Plantars left: mute.   Coordination  Finger to nose: Normal.  Heel to shin: Normal.   Sensory  Inconsistent pinprick.  Impaired proprioception.  Absent vibration.  Romberg present.  Gait  Slightly wide-based.

## 2025-01-28 NOTE — ASSESSMENT
[FreeTextEntry1] : For her acute symptoms, she will get a Medrol Dosepak.  She will begin tizanidine 2 to 4 mg at bedtime as needed.  She will take meloxicam 7.5 mg daily.  For her chronic discomfort, I will be slowly weaning her down on gabapentin.  She will begin pregabalin 50 mg with titration to 100 mg. I will also give her Voltaren gel.  She is on venlafaxine and duloxetine which is a very curious choice for her peripheral neuropathy.  Since both of these agents are SNRIs, she needs to be weaned off on one of them.  She also has not noticed any improvement.  There is a risk of serotonin syndrome.  Patient expresses understanding.  I will reduce her venlafaxine to 50 mg for a week and then 25 mg for a week and then stop.  She will be scheduled for electrodiagnostic nerve studies and imaging as outlined below.

## 2025-01-28 NOTE — CONSULT LETTER
[Dear  ___] : Dear ~VIOLETTA, [Consult Letter:] : I had the pleasure of evaluating your patient, [unfilled]. [Please see my note below.] : Please see my note below. [FreeTextEntry3] : Sincerely,  Chaya Husain M.D.

## 2025-01-30 NOTE — HISTORY OF PRESENT ILLNESS
[de-identified] : Ms. Mak is a 45-year-old premenopausal female that presents for initial consultation for newly diagnosed DCIS.   Oncological History:   3/1/24 s/p screening mammography and ultrasound at [facility] which showed a new right breast 2:00 density measuring about 1.4 cm and 10 cm from the nipple. There are also some benign-appearing cyst seen in the left breast 10:00 region.  3/18/24 s/p diagnostic imaging and then a right breast ultrasound-guided core biopsy was performed which showed an intermediate grade invasive duct cancer which was ER positive at 60%, OH positive at 80%, HER2 negative by IHC with a Ki-67 of 30%.   [facility] breast, ovarian, colon cancer panel was negative for any mutation.   24 s/p bilateral breast MRI at [facility] showing the localized cancer in the right breast 2:00 region 9 cm from the nipple with some surrounding non-mass like enhancement spanning 2.2 cm in the AP dimension.   24 s/p right breast partial mastectomy with localization device placement and sentinel lymph node biopsy. The final pathology showed a 1.8 cm high-grade invasive duct cancer which remained ER/OH positive HER2/juan negative with a Ki-67 between 40 and 50% and she had 2 negative sentinel lymph nodes and free margins making this a pathologic prognostic stage IA breast cancer.  Oncotype pending  Breast Cancer Risk Factors: Menarche: 9 Date of LMP: has not had menses in 15 y ears bc of hormonal IUD  Menopause:   Age at first live birth: 27 Nursed: yes Hysterectomy: no  Oophorectomy: no OCP:  She does have an IUD in place hormonal getting switched to copper next month with Dr. Arriaga HRT: no Related family history   She has a family history of breast cancer with her maternal grandmother who had breast cancer in her 50s and then a recurrence in her 70s. Her paternal grandmother had breast cancer in her 80s and also had lung cancer. She has a maternal aunt who had breast cancer in her 50s had and then cervical clinical cancer in her 60s. She has a paternal aunt with breast cancer at age 45. Ashkenazi: no BRCA testing: negative   Former smoker wuit 10 years ago smoked for 10 years     [de-identified] : Patient seen and examined in infusion today for follow up. s/p C4 ddAC. and completion of taxol held last two doses due to significant neuropathy and myalgias. She is now taking gabapentin and cymbalta has not noticed a large difference. s/p RT with R breast skin rxn.

## 2025-01-30 NOTE — ASSESSMENT
[FreeTextEntry1] : #Breast Cancer  - IDC - ER positive at 60%, CO positive at 80%, HER2 negative by IHC with a Ki-67 of 30%.  - Reviewed the diagnosis, prognosis and natural history of ER/CO+, HER2 - IDC - Reviewed the imaging and path - Reviewed the NCCN guidelines and patient expresses understanding - Oncotype 21 - Since she is < 50  discussed that there is a ~6.5% benefit for RS 21-25. She wishes to proceed with chemotherapy - Discussed dd AC-->T vs TC - Plan for AC-->T - Echo -Left ventricular cavity is normal in size. Left ventricular wall thickness is normal. Left ventricular systolic function is normal with an ejection fraction visually estimated at 55 to 60 %. There are no regional wall motion abnormalities seen. - vs and labs reviewed. labs drawn in office today. cbc wnl, cmet wnl. ok to proceed with cycle 1 of AC with onpro she is going to do cold capping which she has.  EMLA and zofran ordered PRN - 6/12/24 vs and CBC reviewed; WBC 10.18, hgb 14.4, plt 238, ANC wnl. LFTs wnl. s/p C1 ddAC. Tolerated overall well. Proceed with C2 today  - 6/26/24 vs and CBC reviewed; WBC 15.89, hgb 12.5, plt 211. s/p c2 ddAC, proceed with C3 today. Of note MammaPrint returned as  high risk 1 luminal type B breast cancer. This goes along with her Oncotype recurrence score of 21.  - 7/10/24 - vs and labs reviewed. labs drawn in office today. wbc, hgb and plts wnl. kidney function, liver function, electrolytes. proceed with ddAC - 4th cycle. to start taxol in 2 weeks. steroids ordered. continue with dignicaps - 7/24/24- vs and labs reviewed. labs drawn in office today. wbc 20, hgb and plts wnl. wbc elevated likely due to steroids. monitor. elevated sugars due to steroids - advised to let endo know. ok to proceed with cycle 1 of taxol. - 7/31/24 vs and CBC reviewed; WBC 7.07, hgb 12.1, plt 281 ANC wnl. ALT 34. s/p C1 weekly taxol. Proceed with C2 today. Glucose and WBC more controlled. IF she tolerates this cycle without reaction consider lowering dex to 2 tabs night before.  - 8/7/24 vs and CBC reviewed; WBC 6.25 hgb 12.2, plt 353. LFTs wnl. Mg wnl. Proceed with C3 weekly taxol today. Will lower next dose night before dex to 2 tabs since she is tolerating well and did not have rxn  - 8/14/24 vs and labs reviewed; Patient went directly to infusion and treatment was started without seeing provider. Reviewed labs and were okay to proceed with tx. Discussed with Infusion RN Latonia and Ann Infusion NP the flow of this. Discussed with patient that she needs to see provider prior to start of treatment. +myalgias and pain worse in the back numbness to legs no saddle anesthesia dificulty walking. She got C4 taxol today. Will get spinal imaging. MR SAUCEDO spine ordered.  - 8/21/24 vs and labs reviewed; WBC 7.69, hgb 13.2, plt 324 ANC 7.09. Proceed with C5 today. LFTs wnl. MR L spine for back pain and myalgias (worse to back) did not do yet. Expedited auth request sent to auth team  8/28/24 - vs and labs reviewed. wbc, hgb and plts wnl. proceed with C6 today, pending MRI L spine report - had it done yesterday. Will need to meet with Rad onc as she is finishing chemo - 9/25/24 vs and labs reviewed; See chart notes. arabella has been going directly to infusin to start tx prior to seeing us. Discussed with patient and infusion managers. Seen in office today. C10 weekly taxol. +neuropathy. I had a lengthy discussion with patient regarding cumulative effect of chemo induced neuropathy. she is currently on gabapentin and percocet (for back pain). She states the neuropathy is the same and she wants to continue. I reviewed with her grading of neuropathy and if we need to hold tx or last 2 cycles. She states this is okay today and wishes to continue. Will proceed with caution. Patient did not want to increase gabapentin. Can try cymbalta as well. Cold soaks. Tahmina cold gloves and socks. Rad onc appt 10/2 - 11/2024 vs and labs reviewed; now s/p completion of RT. Held last two cycles taxol patient still has significant myalgias and neuroapthy. Increase cymbalta to 60mg daily. Discussed hormone receptor positive and endocrine therapy. Unclear if menopausal status had not had  menses in years due to IUD which was removed before surgery. check hormones today. Reviwed role of AI vs tamoxifen vs AI/tamoxifen with OFS. Discussed with dr. sanches candidate for OFS given high risk mammaprint. Reviewed with patient role of tamoxifen alone vs with OFS she wishes to proceed with OFS. Will check hormones first and determine if OFS is needed. Would received Zoladex 3.6mg monthly. will get auth. Also advised to scheduled baseline dexa if we switch to AI in future  Monitor hormones monthly.   #Neuropathy  - Bought socks and gloves (cold) to wear during tx  - Cold soaks  - Tolerable and mild right now but if worsening discussed gabapentin in future. Wants to try. Will start at night 2 tabs as this is worse at night for her  - 8/14/24 Gabapentin 200mg in am afternoon and night, tylenol didnot work will try tramadol and get spinal imaging as above  - 8/21/24 gabapentin has been incrased to 200mg TID. Tylenol no relief. Added Tramadol. States she gets more relief with Gabapentin  8/28/24 - increased gabapentin to 300 mg TID. If needed can add cymbalta - 11/2024 last two cycles of taxol held due to significant neuropathy and mylagias, still present. Added cymbalta will increase to 60mg   #DM - Metformin - Ozempic - Has not been taking steroids night before   #HTN - Olmesartan, hctz, amlodipine, metoprolol - Monitor at home  #vit D deficiency - Monitor levels   #Nausea  - Compazine prn   RTC in 1 mos with cbc with diff, cmp, LDH, ESR, CRP hormones   case and management discussed with Dr. Sanches

## 2025-01-30 NOTE — PHYSICAL EXAM
[Fully active, able to carry on all pre-disease performance without restriction] : Status 0 - Fully active, able to carry on all pre-disease performance without restriction [Normal] : affect appropriate [de-identified] : R breast significant erythema nd moist desquamation ot breast folds and under axilla  [de-identified] : decreased sensation to fintertips and tops of tips of toes

## 2025-01-30 NOTE — REVIEW OF SYSTEMS
[Fever] : no fever [Chills] : no chills [Night Sweats] : no night sweats [Fatigue] : fatigue [Recent Change In Weight] : ~T no recent weight change [Diarrhea: Grade 1 - Increase of <4 stools per day over baseline; mild increase in ostomy output compared to baseline] : Diarrhea: Grade 1 - Increase of <4 stools per day over baseline; mild increase in ostomy output compared to baseline [Joint Pain] : joint pain [Muscle Pain] : muscle pain [Confused] : no confusion [Dizziness] : no dizziness [Difficulty Walking] : difficulty walking [Negative] : Allergic/Immunologic [FreeTextEntry2] : weight stable  [de-identified] : 1 episode [de-identified] : +neuropathy

## 2025-02-04 NOTE — PROCEDURE
[FreeTextEntry1] : EMG/NCS of the right arm and left leg [FreeTextEntry3] : EMG/NCS of the right arm and left leg performed today showed only moderate severity right carpal tunnel syndrome. 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 However, this could occur due to the study being performed early in the process before there is Wallerian degeneration. Please consider follow up study to confirm the presence of polyneuropathy.  EMG REPORT WILL BE UPLOADED SEPARATELY AS A PDF

## 2025-02-12 NOTE — PHYSICAL EXAM
[de-identified] : R breast significant erythema nd moist desquamation ot breast folds and under axilla  [de-identified] : decreased sensation to fintertips and tops of tips of toes

## 2025-02-12 NOTE — HISTORY OF PRESENT ILLNESS
[de-identified] : Ms. Mak is a 45-year-old premenopausal female that presents for initial consultation for newly diagnosed DCIS.   Oncological History:   3/1/24 s/p screening mammography and ultrasound at [facility] which showed a new right breast 2:00 density measuring about 1.4 cm and 10 cm from the nipple. There are also some benign-appearing cyst seen in the left breast 10:00 region.  3/18/24 s/p diagnostic imaging and then a right breast ultrasound-guided core biopsy was performed which showed an intermediate grade invasive duct cancer which was ER positive at 60%, HI positive at 80%, HER2 negative by IHC with a Ki-67 of 30%.   [facility], [facility] was negative for any mutation.   24 s/p bilateral breast MRI at [facility] showing the localized cancer in the right breast 2:00 region 9 cm from the nipple with some surrounding non-mass like enhancement spanning 2.2 cm in the AP dimension.   24 s/p right breast partial mastectomy with localization device placement and sentinel lymph node biopsy. The final pathology showed a 1.8 cm high-grade invasive duct cancer which remained ER/HI positive HER2/juan negative with a Ki-67 between 40 and 50% and she had 2 negative sentinel lymph nodes and free margins making this a pathologic prognostic stage IA breast cancer.  Oncotype pending  Breast Cancer Risk Factors: Menarche: 9 Date of LMP: has not had menses in 15 y ears bc of hormonal IUD  Menopause:   Age at first live birth: 27 Nursed: yes Hysterectomy: no  Oophorectomy: no OCP:  She does have an IUD in place hormonal getting switched to copper next month with Dr. Arriaga HRT: no Related family history   She has a family history of breast cancer with her maternal grandmother who had breast cancer in her 50s and then a recurrence in her 70s. Her paternal grandmother had breast cancer in her 80s and also had lung cancer. She has a maternal aunt who had breast cancer in her 50s had and then cervical clinical cancer in her 60s. She has a paternal aunt with breast cancer at age 45. Ashkenazi: no BRCA testing: negative   Former smoker wuit 10 years ago smoked for 10 years     [de-identified] : Patient seen and examined in infusion today for follow up. s/p C4 ddAC. and completion of taxol held last two doses due to significant neuropathy and myalgias. She is now taking gabapentin and cymbalta has not noticed a large difference. s/p RT with R breast skin rxn.

## 2025-02-12 NOTE — ASSESSMENT
[FreeTextEntry1] : #Breast Cancer  - IDC - ER positive at 60%, IN positive at 80%, HER2 negative by IHC with a Ki-67 of 30%.  - Reviewed the diagnosis, prognosis and natural history of ER/IN+, HER2 - IDC - Reviewed the imaging and path - Reviewed the NCCN guidelines and patient expresses understanding - Oncotype 21 - Since she is < 50  discussed that there is a ~6.5% benefit for RS 21-25. She wishes to proceed with chemotherapy - Discussed dd AC-->T vs TC - Plan for AC-->T - Echo -Left ventricular cavity is normal in size. Left ventricular wall thickness is normal. Left ventricular systolic function is normal with an ejection fraction visually estimated at 55 to 60 %. There are no regional wall motion abnormalities seen. - vs and labs reviewed. labs drawn in office today. cbc wnl, cmet wnl. ok to proceed with cycle 1 of AC with onpro she is going to do cold capping which she has.  EMLA and zofran ordered PRN - 6/12/24 vs and CBC reviewed; WBC 10.18, hgb 14.4, plt 238, ANC wnl. LFTs wnl. s/p C1 ddAC. Tolerated overall well. Proceed with C2 today  - 6/26/24 vs and CBC reviewed; WBC 15.89, hgb 12.5, plt 211. s/p c2 ddAC, proceed with C3 today. Of note MammaPrint returned as  high risk 1 luminal type B breast cancer. This goes along with her Oncotype recurrence score of 21.  - 7/10/24 - vs and labs reviewed. labs drawn in office today. wbc, hgb and plts wnl. kidney function, liver function, electrolytes. proceed with ddAC - 4th cycle. to start taxol in 2 weeks. steroids ordered. continue with dignicaps - 7/24/24- vs and labs reviewed. labs drawn in office today. wbc 20, hgb and plts wnl. wbc elevated likely due to steroids. monitor. elevated sugars due to steroids - advised to let endo know. ok to proceed with cycle 1 of taxol. - 7/31/24 vs and CBC reviewed; WBC 7.07, hgb 12.1, plt 281 ANC wnl. ALT 34. s/p C1 weekly taxol. Proceed with C2 today. Glucose and WBC more controlled. IF she tolerates this cycle without reaction consider lowering dex to 2 tabs night before.  - 8/7/24 vs and CBC reviewed; WBC 6.25 hgb 12.2, plt 353. LFTs wnl. Mg wnl. Proceed with C3 weekly taxol today. Will lower next dose night before dex to 2 tabs since she is tolerating well and did not have rxn  - 8/14/24 vs and labs reviewed; Patient went directly to infusion and treatment was started without seeing provider. Reviewed labs and were okay to proceed with tx. Discussed with Infusion RN Latonia and Ann Infusion NP the flow of this. Discussed with patient that she needs to see provider prior to start of treatment. +myalgias and pain worse in the back numbness to legs no saddle anesthesia dificulty walking. She got C4 taxol today. Will get spinal imaging. MR [facility] spine ordered.  - 8/21/24 vs and labs reviewed; WBC 7.69, hgb 13.2, plt 324 ANC 7.09. Proceed with C5 today. LFTs wnl. MR L spine for back pain and myalgias (worse to back) did not do yet. Expedited auth request sent to auth team  8/28/24 - vs and labs reviewed. wbc, hgb and plts wnl. proceed with C6 today, pending MRI L spine report - had it done yesterday. Will need to meet with Rad onc as she is finishing chemo - 9/25/24 vs and labs reviewed; See chart notes. arabella has been going directly to infusin to start tx prior to seeing us. Discussed with patient and infusion managers. Seen in office today. C10 weekly taxol. +neuropathy. I had a lengthy discussion with patient regarding cumulative effect of chemo induced neuropathy. she is currently on gabapentin and percocet (for back pain). She states the neuropathy is the same and she wants to continue. I reviewed with her grading of neuropathy and if we need to hold tx or last 2 cycles. She states this is okay today and wishes to continue. Will proceed with caution. Patient did not want to increase gabapentin. Can try cymbalta as well. Cold soaks. Tahmina cold gloves and socks. Rad onc appt 10/2 - 11/2024 vs and labs reviewed; now s/p completion of RT. Held last two cycles taxol patient still has significant myalgias and neuroapthy. Increase cymbalta to 60mg daily. Discussed hormone receptor positive and endocrine therapy. Unclear if menopausal status had not had  menses in years due to IUD which was removed before surgery. check hormones today. Reviwed role of AI vs tamoxifen vs AI/tamoxifen with OFS. Discussed with dr. sanches candidate for OFS given high risk mammaprint. Reviewed with patient role of tamoxifen alone vs with OFS she wishes to proceed with OFS. Will check hormones first and determine if OFS is needed. Would received Zoladex 3.6mg monthly. will get auth. Also advised to scheduled baseline dexa if we switch to AI in future  Monitor hormones monthly.   #Neuropathy  - Bought socks and gloves (cold) to wear during tx  - Cold soaks  - Tolerable and mild right now but if worsening discussed gabapentin in future. Wants to try. Will start at night 2 tabs as this is worse at night for her  - 8/14/24 Gabapentin 200mg in am afternoon and night, tylenol didnot work will try tramadol and get spinal imaging as above  - 8/21/24 gabapentin has been incrased to 200mg TID. Tylenol no relief. Added Tramadol. States she gets more relief with Gabapentin  8/28/24 - increased gabapentin to 300 mg TID. If needed can add cymbalta - 11/2024 last two cycles of taxol held due to significant neuropathy and mylagias, still present. Added cymbalta will increase to 60mg   #DM - Metformin - Ozempic - Has not been taking steroids night before   #HTN - Olmesartan, hctz, amlodipine, metoprolol - Monitor at home  #vit D deficiency - Monitor levels   #Nausea  - Compazine prn   RTC in 1 mos with cbc with diff, cmp, LDH, ESR, CRP hormones   case and management discussed with Dr. Sanches

## 2025-02-12 NOTE — REVIEW OF SYSTEMS
[Fever] : no fever [Chills] : no chills [Night Sweats] : no night sweats [Recent Change In Weight] : ~T no recent weight change [Confused] : no confusion [Dizziness] : no dizziness [FreeTextEntry2] : weight stable  [de-identified] : 1 episode [de-identified] : +neuropathy

## 2025-03-06 NOTE — HISTORY OF PRESENT ILLNESS
[de-identified] : Ms. Mak is a 45-year-old premenopausal female that presents for initial consultation for newly diagnosed DCIS.   Oncological History:   3/1/24 s/p screening mammography and ultrasound at [facility] which showed a new right breast 2:00 density measuring about 1.4 cm and 10 cm from the nipple. There are also some benign-appearing cyst seen in the left breast 10:00 region.  3/18/24 s/p diagnostic imaging and then a right breast ultrasound-guided core biopsy was performed which showed an intermediate grade invasive duct cancer which was ER positive at 60%, WV positive at 80%, HER2 negative by IHC with a Ki-67 of 30%.   [facility] breast, ovarian, colon cancer panel was negative for any mutation.   24 s/p bilateral breast MRI at [facility] showing the localized cancer in the right breast 2:00 region 9 cm from the nipple with some surrounding non-mass like enhancement spanning 2.2 cm in the AP dimension.   24 s/p right breast partial mastectomy with localization device placement and sentinel lymph node biopsy. The final pathology showed a 1.8 cm high-grade invasive duct cancer which remained ER/WV positive HER2/juan negative with a Ki-67 between 40 and 50% and she had 2 negative sentinel lymph nodes and free margins making this a pathologic prognostic stage IA breast cancer.  Oncotype pending  Breast Cancer Risk Factors: Menarche: 9 Date of LMP: has not had menses in 15 y ears bc of hormonal IUD  Menopause:   Age at first live birth: 27 Nursed: yes Hysterectomy: no  Oophorectomy: no OCP:  She does have an IUD in place hormonal getting switched to copper next month with Dr. Arriaga HRT: no Related family history   She has a family history of breast cancer with her maternal grandmother who had breast cancer in her 50s and then a recurrence in her 70s. Her paternal grandmother had breast cancer in her 80s and also had lung cancer. She has a maternal aunt who had breast cancer in her 50s had and then cervical clinical cancer in her 60s. She has a paternal aunt with breast cancer at age 45. Ashkenazi: no BRCA testing: negative   Former smoker wuit 10 years ago smoked for 10 years     [de-identified] : Patient seen and examined in infusion today for follow up.  has significant neuropathy and myalgias.  complains of neck pain and weakness in Upper extremities and significant back and hip pain saw Dr. Solis- neurology

## 2025-03-06 NOTE — ASSESSMENT
[FreeTextEntry1] : #Breast Cancer  - IDC - ER positive at 60%, MI positive at 80%, HER2 negative by IHC with a Ki-67 of 30%.  - Reviewed the diagnosis, prognosis and natural history of ER/MI+, HER2 - IDC - Reviewed the imaging and path - Reviewed the NCCN guidelines and patient expresses understanding - Oncotype 21 - Since she is < 50  discussed that there is a ~6.5% benefit for RS 21-25. She wishes to proceed with chemotherapy - s/p dd AC-->T - Echo -Left ventricular cavity is normal in size. Left ventricular wall thickness is normal. Left ventricular systolic function is normal with an ejection fraction visually estimated at 55 to 60 %. There are no regional wall motion abnormalities seen. - vs and labs reviewed. labs drawn in office today. cbc wnl, cmet wnl. ok to proceed with cycle 1 of AC with onpro she is going to do cold capping which she has.  EMLA and zofran ordered PRN - 6/12/24 vs and CBC reviewed; WBC 10.18, hgb 14.4, plt 238, ANC wnl. LFTs wnl. s/p C1 ddAC. Tolerated overall well. Proceed with C2 today  - 6/26/24 vs and CBC reviewed; WBC 15.89, hgb 12.5, plt 211. s/p c2 ddAC, proceed with C3 today. Of note MammaPrint returned as  high risk 1 luminal type B breast cancer. This goes along with her Oncotype recurrence score of 21.  - 7/10/24 - vs and labs reviewed. labs drawn in office today. wbc, hgb and plts wnl. kidney function, liver function, electrolytes. proceed with ddAC - 4th cycle. to start taxol in 2 weeks. steroids ordered. continue with dignicaps - 7/24/24- vs and labs reviewed. labs drawn in office today. wbc 20, hgb and plts wnl. wbc elevated likely due to steroids. monitor. elevated sugars due to steroids - advised to let endo know. ok to proceed with cycle 1 of taxol. - 7/31/24 vs and CBC reviewed; WBC 7.07, hgb 12.1, plt 281 ANC wnl. ALT 34. s/p C1 weekly taxol. Proceed with C2 today. Glucose and WBC more controlled. IF she tolerates this cycle without reaction consider lowering dex to 2 tabs night before.  - 8/7/24 vs and CBC reviewed; WBC 6.25 hgb 12.2, plt 353. LFTs wnl. Mg wnl. Proceed with C3 weekly taxol today. Will lower next dose night before dex to 2 tabs since she is tolerating well and did not have rxn  - 8/14/24 vs and labs reviewed; Patient went directly to infusion and treatment was started without seeing provider. Reviewed labs and were okay to proceed with tx. Discussed with Infusion RN Latonia and Ann Infusion NP the flow of this. Discussed with patient that she needs to see provider prior to start of treatment. +myalgias and pain worse in the back numbness to legs no saddle anesthesia dificulty walking. She got C4 taxol today. Will get spinal imaging. MR [facility]UC[facility]O spine ordered.  - 8/21/24 vs and labs reviewed; WBC 7.69, hgb 13.2, plt 324 ANC 7.09. Proceed with C5 today. LFTs wnl. MR L spine for back pain and myalgias (worse to back) did not do yet. Expedited auth request sent to auth team  8/28/24 - vs and labs reviewed. wbc, hgb and plts wnl. proceed with C6 today, pending MRI L spine report - had it done yesterday. Will need to meet with Rad onc as she is finishing chemo - 9/25/24 vs and labs reviewed; See chart notes. arabella has been going directly to infusin to start tx prior to seeing us. Discussed with patient and infusion managers. Seen in office today. C10 weekly taxol. +neuropathy. I had a lengthy discussion with patient regarding cumulative effect of chemo induced neuropathy. she is currently on gabapentin and percocet (for back pain). She states the neuropathy is the same and she wants to continue. I reviewed with her grading of neuropathy and if we need to hold tx or last 2 cycles. She states this is okay today and wishes to continue. Will proceed with caution. Patient did not want to increase gabapentin. Can try cymbalta as well. Cold soaks. [place]mina cold gloves and socks. Rad onc appt 10/2 - 11/2024 vs and labs reviewed; now s/p completion of RT. Held last two cycles taxol patient still has significant myalgias and neuroapthy. Increase cymbalta to 60mg daily. Discussed hormone receptor positive and endocrine therapy. Unclear if menopausal status had not had  menses in years due to IUD which was removed before surgery. check hormones today. Reviwed role of AI vs tamoxifen vs AI/tamoxifen with OFS. Discussed with dr. casas candidate for OFS given high risk mammaprint. Reviewed with patient role of tamoxifen alone vs with OFS she wishes to proceed with OFS. Will check hormones first and determine if OFS is needed. Would received Zoladex 3.6mg monthly. will get auth. Also advised to scheduled baseline dexa if we switch to AI in future  Monitor hormones monthly.  3/6/25 - vs and labs reviewed. complains of neuropathy - was on gabapentin and cymbalta - advised to try acupunture. continue zoladex and tamoxifen. complains of neck, back and hip pain - will get MRI of spine given history of breast cancer  #Neuropathy  - Bought socks and gloves (cold) to wear during tx  - Cold soaks  - Tolerable and mild right now but if worsening discussed gabapentin in future. Wants to try. Will start at night 2 tabs as this is worse at night for her  - 8/14/24 Gabapentin 200mg in am afternoon and night, tylenol didnot work will try tramadol and get spinal imaging as above  - 8/21/24 gabapentin has been incrased to 200mg TID. Tylenol no relief. Added Tramadol. States she gets more relief with Gabapentin  8/28/24 - increased gabapentin to 300 mg TID. If needed can add cymbalta - 11/2024 last two cycles of taxol held due to significant neuropathy and mylagias, still present. Added cymbalta will increase to 60mg  3/6/25 - saw Dr. Martinez plan to switch from gabapentin to pregabalin  #complains of neck pain and weakness in Upper extremities and significant back and hip pain MRI C/T/L spine and hips  #DM - Metformin - Ozempic - Has not been taking steroids night before   #HTN - Olmesartan, hctz, amlodipine, metoprolol - Monitor at home  #vit D deficiency - Monitor levels   #Nausea  - Compazine prn   RTC in 2-3 mos with cbc with diff, cmp, LDH, ESR, CRP hormones

## 2025-03-06 NOTE — PHYSICAL EXAM
[de-identified] : R breast significant erythema nd moist desquamation ot breast folds and under axilla  [de-identified] : decreased sensation to fintertips and tops of tips of toes

## 2025-03-06 NOTE — PHYSICAL EXAM
[de-identified] : R breast significant erythema nd moist desquamation ot breast folds and under axilla  [de-identified] : decreased sensation to fintertips and tops of tips of toes

## 2025-03-06 NOTE — BEGINNING OF VISIT
[0] : 2) Feeling down, depressed, or hopeless: Not at all (0) [[id number]] : 0 [Pain Scale: ___] : On a scale of 1-10, today the patient's pain is a(n) [unfilled]. [Future Reassessment of Pain Scale] : Future reassessment of pain scale [Medication(s)] : Medication(s) [Never] : Never [Date Discussed (MM/DD/YY): ___] : Discussed: [unfilled] [With Patient/Caregiver] : with Patient/Caregiver

## 2025-03-06 NOTE — ASSESSMENT
[FreeTextEntry1] : #Breast Cancer  - IDC - ER positive at 60%, NE positive at 80%, HER2 negative by IHC with a Ki-67 of 30%.  - Reviewed the diagnosis, prognosis and natural history of ER/NE+, HER2 - IDC - Reviewed the imaging and path - Reviewed the NCCN guidelines and patient expresses understanding - Oncotype 21 - Since she is < 50  discussed that there is a ~6.5% benefit for RS 21-25. She wishes to proceed with chemotherapy - s/p dd AC-->T - Echo -Left ventricular cavity is normal in size. Left ventricular wall thickness is normal. Left ventricular systolic function is normal with an ejection fraction visually estimated at 55 to 60 %. There are no regional wall motion abnormalities seen. - vs and labs reviewed. labs drawn in office today. cbc wnl, cmet wnl. ok to proceed with cycle 1 of AC with onpro she is going to do cold capping which she has.  EMLA and zofran ordered PRN - 6/12/24 vs and CBC reviewed; WBC 10.18, hgb 14.4, plt 238, ANC wnl. LFTs wnl. s/p C1 ddAC. Tolerated overall well. Proceed with C2 today  - 6/26/24 vs and CBC reviewed; WBC 15.89, hgb 12.5, plt 211. s/p c2 ddAC, proceed with C3 today. Of note MammaPrint returned as  high risk 1 luminal type B breast cancer. This goes along with her Oncotype recurrence score of 21.  - 7/10/24 - vs and labs reviewed. labs drawn in office today. wbc, hgb and plts wnl. kidney function, liver function, electrolytes. proceed with ddAC - 4th cycle. to start taxol in 2 weeks. steroids ordered. continue with dignicaps - 7/24/24- vs and labs reviewed. labs drawn in office today. wbc 20, hgb and plts wnl. wbc elevated likely due to steroids. monitor. elevated sugars due to steroids - advised to let endo know. ok to proceed with cycle 1 of taxol. - 7/31/24 vs and CBC reviewed; WBC 7.07, hgb 12.1, plt 281 ANC wnl. ALT 34. s/p C1 weekly taxol. Proceed with C2 today. Glucose and WBC more controlled. IF she tolerates this cycle without reaction consider lowering dex to 2 tabs night before.  - 8/7/24 vs and CBC reviewed; WBC 6.25 hgb 12.2, plt 353. LFTs wnl. Mg wnl. Proceed with C3 weekly taxol today. Will lower next dose night before dex to 2 tabs since she is tolerating well and did not have rxn  - 8/14/24 vs and labs reviewed; Patient went directly to infusion and treatment was started without seeing provider. Reviewed labs and were okay to proceed with tx. Discussed with Infusion RN Latonia and Ann Infusion NP the flow of this. Discussed with patient that she needs to see provider prior to start of treatment. +myalgias and pain worse in the back numbness to legs no saddle anesthesia dificulty walking. She got C4 taxol today. Will get spinal imaging. MR [facility] spine ordered.  - 8/21/24 vs and labs reviewed; WBC 7.69, hgb 13.2, plt 324 ANC 7.09. Proceed with C5 today. LFTs wnl. MR L spine for back pain and myalgias (worse to back) did not do yet. Expedited auth request sent to auth team  8/28/24 - vs and labs reviewed. wbc, hgb and plts wnl. proceed with C6 today, pending MRI L spine report - had it done yesterday. Will need to meet with Rad onc as she is finishing chemo - 9/25/24 vs and labs reviewed; See chart notes. arabella has been going directly to infusin to start tx prior to seeing us. Discussed with patient and infusion managers. Seen in office today. C10 weekly taxol. +neuropathy. I had a lengthy discussion with patient regarding cumulative effect of chemo induced neuropathy. she is currently on gabapentin and percocet (for back pain). She states the neuropathy is the same and she wants to continue. I reviewed with her grading of neuropathy and if we need to hold tx or last 2 cycles. She states this is okay today and wishes to continue. Will proceed with caution. Patient did not want to increase gabapentin. Can try cymbalta as well. Cold soaks. [place]mina cold gloves and socks. Rad onc appt 10/2 - 11/2024 vs and labs reviewed; now s/p completion of RT. Held last two cycles taxol patient still has significant myalgias and neuroapthy. Increase cymbalta to 60mg daily. Discussed hormone receptor positive and endocrine therapy. Unclear if menopausal status had not had  menses in years due to IUD which was removed before surgery. check hormones today. Reviwed role of AI vs tamoxifen vs AI/tamoxifen with OFS. Discussed with dr. casas candidate for OFS given high risk mammaprint. Reviewed with patient role of tamoxifen alone vs with OFS she wishes to proceed with OFS. Will check hormones first and determine if OFS is needed. Would received Zoladex 3.6mg monthly. will get auth. Also advised to scheduled baseline dexa if we switch to AI in future  Monitor hormones monthly.  3/6/25 - vs and labs reviewed. complains of neuropathy - was on gabapentin and cymbalta - advised to try acupunture. continue zoladex and tamoxifen. complains of neck, back and hip pain - will get MRI of spine given history of breast cancer  #Neuropathy  - Bought socks and gloves (cold) to wear during tx  - Cold soaks  - Tolerable and mild right now but if worsening discussed gabapentin in future. Wants to try. Will start at night 2 tabs as this is worse at night for her  - 8/14/24 Gabapentin 200mg in am afternoon and night, tylenol didnot work will try tramadol and get spinal imaging as above  - 8/21/24 gabapentin has been incrased to 200mg TID. Tylenol no relief. Added Tramadol. States she gets more relief with Gabapentin  8/28/24 - increased gabapentin to 300 mg TID. If needed can add cymbalta - 11/2024 last two cycles of taxol held due to significant neuropathy and mylagias, still present. Added cymbalta will increase to 60mg  3/6/25 - saw Dr. Martinez plan to switch from gabapentin to pregabalin  #complains of neck pain and weakness in Upper extremities and significant back and hip pain MRI C/T/L spine and hips  #DM - Metformin - Ozempic - Has not been taking steroids night before   #HTN - Olmesartan, hctz, amlodipine, metoprolol - Monitor at home  #vit D deficiency - Monitor levels   #Nausea  - Compazine prn   RTC in 2-3 mos with cbc with diff, cmp, LDH, ESR, CRP hormones

## 2025-03-06 NOTE — BEGINNING OF VISIT
[0] : 2) Feeling down, depressed, or hopeless: Not at all (0) [[id number]] : 0 [Pain Scale: ___] : On a scale of 1-10, today the patient's pain is a(n) [unfilled]. [Future Reassessment of Pain Scale] : Future reassessment of pain scale [Medication(s)] : Medication(s) [Never] : Never [Date Discussed (MM/DD/YY): ___] : Discussed: [unfilled] [With Patient/Caregiver] : with Patient/Caregiver

## 2025-03-06 NOTE — REVIEW OF SYSTEMS
[Fever] : no fever [Chills] : no chills [Night Sweats] : no night sweats [Recent Change In Weight] : ~T no recent weight change [Confused] : no confusion [Dizziness] : no dizziness [FreeTextEntry2] : weight stable  [de-identified] : 1 episode [de-identified] : +neuropathy

## 2025-03-06 NOTE — REVIEW OF SYSTEMS
[Fever] : no fever [Chills] : no chills [Night Sweats] : no night sweats [Recent Change In Weight] : ~T no recent weight change [Confused] : no confusion [Dizziness] : no dizziness [FreeTextEntry2] : weight stable  [de-identified] : 1 episode [de-identified] : +neuropathy

## 2025-03-06 NOTE — HISTORY OF PRESENT ILLNESS
[de-identified] : Ms. Mak is a 45-year-old premenopausal female that presents for initial consultation for newly diagnosed DCIS.   Oncological History:   3/1/24 s/p screening mammography and ultrasound at [facility] which showed a new right breast 2:00 density measuring about 1.4 cm and 10 cm from the nipple. There are also some benign-appearing cyst seen in the left breast 10:00 region.  3/18/24 s/p diagnostic imaging and then a right breast ultrasound-guided core biopsy was performed which showed an intermediate grade invasive duct cancer which was ER positive at 60%, MT positive at 80%, HER2 negative by IHC with a Ki-67 of 30%.   [place] breast, ovarian, colon cancer panel was negative for any mutation.   24 s/p bilateral breast MRI at [facility] showing the localized cancer in the right breast 2:00 region 9 cm from the nipple with some surrounding non-mass like enhancement spanning 2.2 cm in the AP dimension.   24 s/p right breast partial mastectomy with localization device placement and sentinel lymph node biopsy. The final pathology showed a 1.8 cm high-grade invasive duct cancer which remained ER/MT positive HER2/juan negative with a Ki-67 between 40 and 50% and she had 2 negative sentinel lymph nodes and free margins making this a pathologic prognostic stage IA breast cancer.  Oncotype pending  Breast Cancer Risk Factors: Menarche: 9 Date of LMP: has not had menses in 15 y ears bc of hormonal IUD  Menopause:   Age at first live birth: 27 Nursed: yes Hysterectomy: no  Oophorectomy: no OCP:  She does have an IUD in place hormonal getting switched to copper next month with Dr. Arriaga HRT: no Related family history   She has a family history of breast cancer with her maternal grandmother who had breast cancer in her 50s and then a recurrence in her 70s. Her paternal grandmother had breast cancer in her 80s and also had lung cancer. She has a maternal aunt who had breast cancer in her 50s had and then cervical clinical cancer in her 60s. She has a paternal aunt with breast cancer at age 45. Ashkenazi: no BRCA testing: negative   Former smoker wuit 10 years ago smoked for 10 years     [de-identified] : Patient seen and examined in infusion today for follow up.  has significant neuropathy and myalgias.  complains of neck pain and weakness in Upper extremities and significant back and hip pain saw Dr. Solis- neurology

## 2025-04-17 NOTE — ASSESSMENT
[FreeTextEntry1] : #Breast Cancer  - IDC - ER positive at 60%, MA positive at 80%, HER2 negative by IHC with a Ki-67 of 30%.  - Reviewed the diagnosis, prognosis and natural history of ER/MA+, HER2 - IDC - Reviewed the imaging and path - Reviewed the NCCN guidelines and patient expresses understanding - Oncotype 21 - Since she is < 50  discussed that there is a ~6.5% benefit for RS 21-25. She wishes to proceed with chemotherapy - s/p dd AC-->T - Echo -Left ventricular cavity is normal in size. Left ventricular wall thickness is normal. Left ventricular systolic function is normal with an ejection fraction visually estimated at 55 to 60 %. There are no regional wall motion abnormalities seen. - vs and labs reviewed. labs drawn in office today. cbc wnl, cmet wnl. ok to proceed with cycle 1 of AC with onpro she is going to do cold capping which she has.  EMLA and zofran ordered PRN - 6/12/24 vs and CBC reviewed; WBC 10.18, hgb 14.4, plt 238, ANC wnl. LFTs wnl. s/p C1 ddAC. Tolerated overall well. Proceed with C2 today  - 6/26/24 vs and CBC reviewed; WBC 15.89, hgb 12.5, plt 211. s/p c2 ddAC, proceed with C3 today. Of note MammaPrint returned as  high risk 1 luminal type B breast cancer. This goes along with her Oncotype recurrence score of 21.  - 7/10/24 - vs and labs reviewed. labs drawn in office today. wbc, hgb and plts wnl. kidney function, liver function, electrolytes. proceed with ddAC - 4th cycle. to start taxol in 2 weeks. steroids ordered. continue with dignicaps - 7/24/24- vs and labs reviewed. labs drawn in office today. wbc 20, hgb and plts wnl. wbc elevated likely due to steroids. monitor. elevated sugars due to steroids - advised to let endo know. ok to proceed with cycle 1 of taxol. - 7/31/24 vs and CBC reviewed; WBC 7.07, hgb 12.1, plt 281 ANC wnl. ALT 34. s/p C1 weekly taxol. Proceed with C2 today. Glucose and WBC more controlled. IF she tolerates this cycle without reaction consider lowering dex to 2 tabs night before.  - 8/7/24 vs and CBC reviewed; WBC 6.25 hgb 12.2, plt 353. LFTs wnl. Mg wnl. Proceed with C3 weekly taxol today. Will lower next dose night before dex to 2 tabs since she is tolerating well and did not have rxn  - 8/14/24 vs and labs reviewed; Patient went directly to infusion and treatment was started without seeing provider. Reviewed labs and were okay to proceed with tx. Discussed with Infusion RN Latonia and Ann Infusion NP the flow of this. Discussed with patient that she needs to see provider prior to start of treatment. +myalgias and pain worse in the back numbness to legs no saddle anesthesia dificulty walking. She got C4 taxol today. Will get spinal imaging. MR [facility] spine ordered.  - 8/21/24 vs and labs reviewed; WBC 7.69, hgb 13.2, plt 324 ANC 7.09. Proceed with C5 today. LFTs wnl. MR L spine for back pain and myalgias (worse to back) did not do yet. Expedited auth request sent to auth team  8/28/24 - vs and labs reviewed. wbc, hgb and plts wnl. proceed with C6 today, pending MRI L spine report - had it done yesterday. Will need to meet with Rad onc as she is finishing chemo - 9/25/24 vs and labs reviewed; See chart notes. arabella has been going directly to infusin to start tx prior to seeing us. Discussed with patient and infusion managers. Seen in office today. C10 weekly taxol. +neuropathy. I had a lengthy discussion with patient regarding cumulative effect of chemo induced neuropathy. she is currently on gabapentin and percocet (for back pain). She states the neuropathy is the same and she wants to continue. I reviewed with her grading of neuropathy and if we need to hold tx or last 2 cycles. She states this is okay today and wishes to continue. Will proceed with caution. Patient did not want to increase gabapentin. Can try cymbalta as well. Cold soaks. [place] cold gloves and socks. Rad onc appt 10/2 - 11/2024 vs and labs reviewed; now s/p completion of RT. Held last two cycles taxol patient still has significant myalgias and neuroapthy. Increase cymbalta to 60mg daily. Discussed hormone receptor positive and endocrine therapy. Unclear if menopausal status had not had  menses in years due to IUD which was removed before surgery. check hormones today. Reviwed role of AI vs tamoxifen vs AI/tamoxifen with OFS. Discussed with dr. casas candidate for OFS given high risk mammaprint. Reviewed with patient role of tamoxifen alone vs with OFS she wishes to proceed with OFS. Will check hormones first and determine if OFS is needed. Would received Zoladex 3.6mg monthly. will get auth. Also advised to scheduled baseline dexa if we switch to AI in future  Monitor hormones monthly.  3/6/25 - vs and labs reviewed. complains of neuropathy - was on gabapentin and cymbalta - advised to try acupunture. continue zoladex and tamoxifen. complains of neck, back and hip pain - will get MRI of spine given history of breast cancer  #Neuropathy  - Bought socks and gloves (cold) to wear during tx  - Cold soaks  - Tolerable and mild right now but if worsening discussed gabapentin in future. Wants to try. Will start at night 2 tabs as this is worse at night for her  - 8/14/24 Gabapentin 200mg in am afternoon and night, tylenol didnot work will try tramadol and get spinal imaging as above  - 8/21/24 gabapentin has been incrased to 200mg TID. Tylenol no relief. Added Tramadol. States she gets more relief with Gabapentin  8/28/24 - increased gabapentin to 300 mg TID. If needed can add cymbalta - 11/2024 last two cycles of taxol held due to significant neuropathy and mylagias, still present. Added cymbalta will increase to 60mg  3/6/25 - saw Dr. Martinez plan to switch from gabapentin to pregabalin  #complains of neck pain and weakness in Upper extremities and significant back and hip pain MRI C/T/L spine and hips  #DM - Metformin - Ozempic - Has not been taking steroids night before   #HTN - Olmesartan, hctz, amlodipine, metoprolol - Monitor at home  #vit D deficiency - Monitor levels   #Nausea  - Compazine prn   RTC in 2-3 mos with cbc with diff, cmp, LDH, ESR, CRP hormones

## 2025-04-17 NOTE — PHYSICAL EXAM
[Fully active, able to carry on all pre-disease performance without restriction] : Status 0 - Fully active, able to carry on all pre-disease performance without restriction [Normal] : affect appropriate [de-identified] : R breast significant erythema nd moist desquamation ot breast folds and under axilla  [de-identified] : decreased sensation to fintertips and tops of tips of toes

## 2025-04-17 NOTE — REVIEW OF SYSTEMS
[Fever] : no fever [Chills] : no chills [Night Sweats] : no night sweats [Fatigue] : fatigue [Recent Change In Weight] : ~T no recent weight change [Diarrhea: Grade 1 - Increase of <4 stools per day over baseline; mild increase in ostomy output compared to baseline] : Diarrhea: Grade 1 - Increase of <4 stools per day over baseline; mild increase in ostomy output compared to baseline [Joint Pain] : joint pain [Muscle Pain] : muscle pain [Confused] : no confusion [Dizziness] : no dizziness [Difficulty Walking] : difficulty walking [Negative] : Allergic/Immunologic [FreeTextEntry2] : weight stable  [de-identified] : 1 episode [de-identified] : +neuropathy

## 2025-04-17 NOTE — HISTORY OF PRESENT ILLNESS
[de-identified] : Ms. Mak is a 45-year-old premenopausal female that presents for initial consultation for newly diagnosed DCIS.   Oncological History:   3/1/24 s/p screening mammography and ultrasound at [facility] which showed a new right breast 2:00 density measuring about 1.4 cm and 10 cm from the nipple. There are also some benign-appearing cyst seen in the left breast 10:00 region.  3/18/24 s/p diagnostic imaging and then a right breast ultrasound-guided core biopsy was performed which showed an intermediate grade invasive duct cancer which was ER positive at 60%, CO positive at 80%, HER2 negative by IHC with a Ki-67 of 30%.   [place] breast, ovarian, colon cancer panel was negative for any mutation.   24 s/p bilateral breast MRI at [facility] showing the localized cancer in the right breast 2:00 region 9 cm from the nipple with some surrounding non-mass like enhancement spanning 2.2 cm in the AP dimension.   24 s/p right breast partial mastectomy with localization device placement and sentinel lymph node biopsy. The final pathology showed a 1.8 cm high-grade invasive duct cancer which remained ER/CO positive HER2/juan negative with a Ki-67 between 40 and 50% and she had 2 negative sentinel lymph nodes and free margins making this a pathologic prognostic stage IA breast cancer.  Oncotype pending  Breast Cancer Risk Factors: Menarche: 9 Date of LMP: has not had menses in 15 y ears bc of hormonal IUD  Menopause:   Age at first live birth: 27 Nursed: yes Hysterectomy: no  Oophorectomy: no OCP:  She does have an IUD in place hormonal getting switched to copper next month with Dr. Arriaga HRT: no Related family history   She has a family history of breast cancer with her maternal grandmother who had breast cancer in her 50s and then a recurrence in her 70s. Her paternal grandmother had breast cancer in her 80s and also had lung cancer. She has a maternal aunt who had breast cancer in her 50s had and then cervical clinical cancer in her 60s. She has a paternal aunt with breast cancer at age 45. Ashkenazi: no BRCA testing: negative   Former smoker wuit 10 years ago smoked for 10 years     [de-identified] : Patient seen and examined in infusion today for follow up.  has significant neuropathy and myalgias.  complains of neck pain and weakness in Upper extremities and significant back and hip pain saw Dr. Solis- neurology [E] : definite

## 2025-04-21 NOTE — PHYSICAL EXAM
[Normal] : well developed, well nourished, in no acute distress [Oriented To Time, Place, And Person] : oriented to person, place, and time [de-identified] : telehealth

## 2025-04-21 NOTE — DISEASE MANAGEMENT
[Pathological] : TNM Stage: p [FreeTextEntry4] : Invasive Ductal Carcinoma of the right breast, ER/DC+ [TTNM] : 1c [NTNM] : 0 [MTNM] : 0 [IA] : IA

## 2025-04-21 NOTE — HISTORY OF PRESENT ILLNESS
[FreeTextEntry1] : Ms. Mak is a 45-year-old female, diagnosed with ER/MD+ Stage IA T1cN0 invasive ductal carcinoma of the right breast, status post right breast partial mastectomy and sentinel lymph node biopsy, s/p adjuvant chemotherapy followed by radiation. She is present for her post treatment evaluation.   Ms. Mak underwent screening mammography and ultrasound on March 1, 2024 at [facility] which revealed a new 1.3 cm hypoechoic mass with ill-defined margins for which additional spot compression views were suggested. At the 10 o'clock daphnie-areolar location, there was a well circumscribed 0.8 cm mass. On 3/18/24, she underwent right diagnostic mammogram and bilateral sonogram that revealed 1.4 cm hypoechoic mass with angular margins at 2:00 of the right breast.  Right breast ultrasound-guided core biopsy was performed on 3/18/24 and pathology revealed an intermediate grade invasive moderately differentiated ductal cancer. Biomarkers were ER positive at 60%, MD positive at 80%, HER2 negative by IHC with a Ki-67 of 30%.  Staging MRI breast (4/2/24) demonstrated enhancing mass with irregular margins corresponding to biopsy proven carcinoma.   On April 29, 2024, she underwent a right breast partial mastectomy with localization device placement and sentinel lymph node biopsy performed by Dr. Ibanez. The final pathology demonstrated a 1.8 cm high-grade invasive ductal carcinoma. There were 2 negative sentinel lymph nodes and margins were negative. Pathologic prognostic stage IA breast cancer. Oncotype recurrence score was 21. Genetic testing was negative.   She received 4 cycles of ddAC completed on 7/10/24 under the care of Dr. Sanches. She is status post cycle 10 of Taxol on 9/25/24 and she is scheduled to complete on 10/9/24.  She reports joint and back pain and neuropathy in her hands and feet. She is on Gabapentin 300 mg TID.   Ms. Mak is present today for routine follow up status post completion of adjuvant radiation therapy. She reports she is doing well. She reports radiation dermatitis has healed well. She has mild fatigue present. She has started Tamoxifen 1 month ago and reports hot flashes present. She has follow-up by med oncology on 4/24/2025 and saw Dr. Ibanez in 1/13/2025. Overall, she is doing well with improving post treatment side effects.  4/9/2025 MRI Thoracic Spine: IMPRESSION: 1.  No evidence of metastatic disease. 4/9/2025 MRI Lumbar Spine: IMPRESSION: Status post left L5 laminectomy. Recommend correlation with the patient's prior surgical history. Grade 1 anterolisthesis of L5 on S1, unchanged. Minimal anterolisthesis of L4 on L5, unchanged. Degenerative changes. No evidence of spinal canal stenosis. L4-L5 mild disc bulge in close proximity to the right distal foraminal/far lateral L4 exiting nerve root, unchanged. L4/L5 no evidence of spinal canal stenosis. L5-S1 disc bulge and osteophyte, unchanged. L5/S1 no evidence of spinal canal stenosis. 4/9/2025 MRI Right Hip: IMPRESSION: Mild right hip osteoarthrosis with partial-thickness chondral loss, osteophyte formation and degenerative labral tearing. Small to moderate joint effusion. Small focus of subcortical bone marrow edema at the inner aspect of the femoral head adjacent to the ligamentum teres insertion that is nonspecific but may be related to chondral loss or enthesopathic change at the fovea. This does not have the appearance of osseous malignancy.

## 2025-06-04 NOTE — PHYSICAL EXAM
[de-identified] : R breast significant erythema nd moist desquamation ot breast folds and under axilla  [de-identified] : decreased sensation to fintertips and tops of tips of toes

## 2025-06-04 NOTE — PHYSICAL EXAM
[Normal muscle bulk without asymmetry] : normal muscle bulk without asymmetry [Facet Tenderness] : facet tenderness [Spine: Flexion to ___ degrees, without pain] : spine: flexion to [unfilled] degrees, without pain [Spurling] : positive Spurling's Test [Neer's] : positive Neer's Test [] : Motor: [Normal] : Normal affect

## 2025-06-04 NOTE — REVIEW OF SYSTEMS
[Fever] : no fever [Chills] : no chills [Night Sweats] : no night sweats [Recent Change In Weight] : ~T no recent weight change [FreeTextEntry2] : weight stable  [Confused] : confusion [Negative] : Constitutional [Dizziness] : no dizziness [de-identified] : 1 episode [de-identified] : +neuropathy; brain fog

## 2025-06-04 NOTE — PHYSICAL EXAM
[de-identified] : R breast significant erythema nd moist desquamation ot breast folds and under axilla  [de-identified] : decreased sensation to fintertips and tops of tips of toes

## 2025-06-04 NOTE — REVIEW OF SYSTEMS
[Fever] : no fever [Chills] : no chills [Night Sweats] : no night sweats [Recent Change In Weight] : ~T no recent weight change [FreeTextEntry2] : weight stable  [Confused] : confusion [Negative] : Constitutional [Dizziness] : no dizziness [de-identified] : 1 episode [de-identified] : +neuropathy; brain fog

## 2025-06-04 NOTE — HISTORY OF PRESENT ILLNESS
[Back Pain] : back pain [___ mths] : [unfilled] month(s) ago [Constant] : constant [6] : an average pain level of 6/10 [5] : a minimum pain level of 5/10 [8] : a maximum pain level of 8/10 [Dull] : dull [Aching] : aching [Stabbing] : stabbing [Shooting] : shooting [Laying] : laying [Sitting] : sitting [Standing] : standing [Walking] : walking [Medications] : medications [Other: ___] : [unfilled] [FreeTextEntry1] : HPI  Ms. IRAIDA DAVIS is a 46 year F with pmhx breast ca on tamoxifen presents with bilateral back pain radiating to bilateral buttock. Axial worse with transition.  Pain is so bad that patient finds it difficult to perform adls and ambulate. denies any worsening numbness, weakness, bowel/bladder dysfunction.    Previous and current pain medications/doses/effects:  Tylenol  Previous Pain Treatments:  PT  Previous Pain Injections:  na  Previous Diagnostic Studies/Images:  MRI TS 5/25  Technique: MRI of the cervical spine was performed utilizing axial and sagittal  T1, axial and sagittal T2-weighted  as well as sagittal STIR images.   There are no prior studies available for comparison.   Findings: There is straightening of the cervical spine that may be positional. There is a hemangioma within the C4 vertebral body. The vertebral bodies are of normal height and configuration. The intervertebral discs spaces are   within normal limits. Evaluation of the spinal cord demonstrates no evidence of abnormal signal changes.  The marrow signal is within normal limits.  The cervicomedullary junction is normal.   Evaluation of the individual levels demonstrates no evidence of a focal disc herniation or spinal cord compression.   Technique: MRI of the thoracic spine was performed utilizing sagittal  T1, axial and sagittal T2- weighted and axial gradient echo images as well as sagittal STIR images.   There are no prior studies available for comparison.   Findings: There is normal curvature to the thoracic spine. There are multiple scattered hemangiomas within the thoracic spine notable at T1, T7 and, T9, T10, T11. The vertebral bodies are of normal height and configuration. The intervertebral discs spaces are   within normal limits. Evaluation of the spinal cord demonstrates no evidence of abnormal signal changes.   Evaluation of the individual levels demonstrates no evidence of a focal disc herniation or spinal cord compression.   Impression:  MRI of the cervical spine:  Hemangiomas, unchanged. No evidence to suggest metastatic disease.  No evidence of a focal disc herniation. No evidence of spinal cord compression.   MRI of the thoracic spine:  Multiple hemangiomas, unchanged. No evidence to suggest metastatic disease.  No evidence of focal disc herniation or spinal cord compression.  MRI R Hip 4/25  Mild right hip osteoarthrosis with partial-thickness chondral loss, osteophyte formation and degenerative labral tearing. Small to moderate joint effusion.   Small focus of subcortical bone marrow edema at the inner aspect of the femoral head adjacent to the ligamentum teres insertion that is nonspecific but may be related to chondral loss or enthesopathic change at the fovea. This does not have the appearance of osseous malignancy.  MRI LS 4/25  Exam Date:      04/09/25 Exam:         MRI LUMBAR SPINE Order#:       [id number]    CLINICAL INFORMATION: Pain. Trouble walking. Numbness.   ADDITIONAL CLINICAL INFORMATION: Spondylolisthesis M43.16   TECHNIQUE: Multiplanar, multisequence MRI was performed of the lumbar spine.  IV Contrast: NONE   PRIOR STUDIES: MRI of the lumbar spine performed on 8/27/2024   FINDINGS:   LOCALIZER: No additional findings.  BONES: The patient appears to be status post left L5/S1 laminectomy. There is a small hemangioma within the T11 vertebral body. There is a probable small hemangioma within the S2 vertebral body, unchanged. There is no fracture or bone marrow edema.  ALIGNMENT: There is grade 1 anterolisthesis of L5 on S1, unchanged. There is minimal anterolisthesis of L4 on L5, unchanged. No pars defects are identified.  Disc spaces: There is mild loss of disc height and T2 signal at the L4/L5 and L5/S1 disc spaces consistent with desiccation. There is an annular tear seen within the L5/S1 disc space.  SACROILIAC JOINTS/SACRUM: There is no sacral fracture. The SI joints are partially visualized but are intact.  CONUS AND CAUDA EQUINA: The distal cord and conus are normal in signal. Conus terminates at L1.  VISUALIZED INTRAPELVIC/INTRA-ABDOMINAL SOFT TISSUES: Normal.  PARASPINAL SOFT TISSUES: Normal.    INDIVIDUAL LEVELS:   LOWER THORACIC SPINE: No spinal canal or neuroforaminal stenosis.   L1-L2: No spinal canal or neuroforaminal stenosis.  L2-L3: No spinal canal or neuroforaminal stenosis.  L3-L4: There is no evidence of a focal disc herniation. There is moderate facet and ligamentous hypertrophy. There is minimal left foraminal narrowing. The right neuroforamen is patent. The bilateral L3 foraminal exiting nerve roots appear within normal limits. There is no evidence of spinal canal stenosis.  L4-L5: There is a mild disc bulge contacting the ventral thecal sac and contacting the right distal foraminal/far lateral L4 exiting nerve root. There is moderate to severe right foraminal narrowing. There is severe facet and ligamentous hypertrophy. There is a tiny amount of fluid seen in the facet joints. There is mild left foraminal narrowing. The left L4 foraminal exiting nerve root is within normal limits. There is no evidence of spinal canal stenosis.  L5-S1: There is unroofing of the posterior disc space due to the anterolisthesis. There is a superimposed disc bulge and osteophyte flattening the ventral thecal sac. There is severe facet hypertrophy. There is mild bilateral foraminal narrowing. The bilateral L5 foraminal exiting nerve roots are within normal limits. There is no evidence of spinal canal stenosis.    IMPRESSION:   Status post left L5 laminectomy. Recommend correlation with the patient's prior surgical history. _ NO SURGERY  Grade 1 anterolisthesis of L5 on S1, unchanged.  Minimal anterolisthesis of L4 on L5, unchanged.  Degenerative changes. No evidence of spinal canal stenosis.  L4-L5 mild disc bulge in close proximity to the right distal foraminal/far lateral L4 exiting nerve root, unchanged. L4/L5 no evidence of spinal canal stenosis.  L5-S1 disc bulge and osteophyte, unchanged. L5/S1 no evidence of spinal canal stenosis.  [FreeTextEntry7] : lower back , radiating down ja hips

## 2025-06-04 NOTE — ASSESSMENT
[FreeTextEntry1] : #Breast Cancer  - IDC - ER positive at 60%, NH positive at 80%, HER2 negative by IHC with a Ki-67 of 30%.  - Oncotype 21 - Since she is < 50  discussed that there is a ~6.5% benefit for RS 21-25. She wishes to proceed with chemotherapy - s/p dd AC-->T - Echo -Left ventricular cavity is normal in size. Left ventricular wall thickness is normal. Left ventricular systolic function is normal with an ejection fraction visually estimated at 55 to 60 %. There are no regional wall motion abnormalities seen. - did cold capping and cold gloves and socks - 5/30/24 -7/10/24 - ddAC Of note MammaPrint returned as  high risk 1 luminal type B breast cancer. This goes along with her Oncotype recurrence score of 21.  - 7/24/24- taxol. prior to cycle 4 +myalgias and pain worse in the back numbness to legs no saddle anesthesia dificulty walking.  - 9/25/24 C10 weekly taxol. +neuropathy. I had a lengthy discussion with patient regarding cumulative effect of chemo induced neuropathy. she is currently on gabapentin and percocet (for back pain). She states the neuropathy is the same and she wants to continue. I reviewed with her grading of neuropathy and if we need to hold tx or last 2 cycles. She states this is okay today and wishes to continue. Will proceed with caution. Patient did not want to increase gabapentin. Can try cymbalta as well. Cold soaks. Tahmina cold gloves and socks. Rad onc appt 10/2 - 11/2024 Held last two cycles taxol patient still has significant myalgias and neuropathy - Reviewed with patient role of tamoxifen alone vs with OFS she wishes to proceed with OFS.  - Zoladex 3.6mg monthly and on tamoxifen. continue  #Neuropathy  - Bought socks and gloves (cold) to wear during tx  - Cold soaks  - on gabapentin and cymbalta - follows with Dr. Martinez - paraneoplastic work up negative - check a1c.  #complains of neck pain and weakness in Upper extremities and significant back and hip pain reviewed MRI C/T/L spine and hips - no evidence of osseous mets check for autoimmune condition with diaz, sleroderma, sjogrens, lupus, RA  check for lyme seeing pain, Dr. Vera - if no releif will send to Dr. Hugo She has tried Medical marijuana at night  #DM - Metformin - Ozempic - Has not been taking steroids night before   #HTN - Olmesartan, hctz, amlodipine, metoprolol - Monitor at home  #vit D deficiency - Monitor levels   #Nausea  - Compazine prn   RTC in 2-3 mos with cbc with diff, cmp, LDH, ESR, CRP hormones

## 2025-06-04 NOTE — ASSESSMENT
[FreeTextEntry1] : >> Imaging and Other Studies   I personally reviewed the relevant imaging.  Discussed and explained to patient the likely source of pathology and pain.  Questions answered.  >> Therapy and Other Modalities   PT  >> Medications   acetaminophen 650mg q8h prn pain (caution <3g daily)  >> Interventions   Significant component of axial back pain secondary to lumbar spondylosis and facet arthropathy demonstrated on MRI LS without significant alternative pertinent findings, PE exam findings of + facet tenderness and facet loading pain.  Pain refractory to conservative treatments.  Will schedule BILATERAL L4-sacral ala diagnostic medial branch block (2 joints, 3 nerves on each side) r/b/a discussed   May consider radiofreqency ablation  >> Consults   >> Discussion of Risks/Benefits/Alternatives    >Regarding any scheduled procedures:   In the event the patient is scheduled for a procedure,   I have discussed in detail with the patient that any interventional pain procedure is associated with potential risks.  The procedure may include an injection of steroids and potentially other medications (local anesthetic and normal saline) into the epidural space or surrounding tissue of the spine.  There are significant risks of this procedure which include and are not limited to infection, bleeding, worsening pain, dural puncture leading to postdural puncture headache, nerve damage, spinal cord injury, paralysis, stroke, and death.   There is a chance that the procedure does not improve their pain.   There are risks associated with the steroid being absorbed into the body systemically.  These include dysphoria, difficulty sleeping, mood swings and personality changes.  Premenopausal women may notice an irregularity in her menstrual cycle for 2-3 months following the injection.  Steroids can specifically affect patients with hypertension, diabetes, and peptic ulcers.  The procedure may cause a temporary increase in blood pressure and blood pressure, and may adversely affect a peptic ulcer.  Other, more rare complications, include avascular necrosis of joints, glaucoma and worsening of osteoporosis.   I have discussed the risks of the procedure at length with the patient, and the potential benefits of pain relief.  I have offered alternatives to the procedure.  All questions were answered.   The patient expressed understanding and wishes to proceed with the procedure.   > Longitudinal management of Complex Painful condition   The patient is being managed for a complex condition that requires ongoing management.  The nature of this condition demands nuanced approach to treatment.  The seriousness of the condition necessitates an in-depth and focused approach to management and coordination with other healthcare professionals.    This visit involves intricate evaluation and management of the patient's condition.  The complexity of the visit was due to the need for detailed assessment of the current state, consideration of potential complications and a careful balancing of treatment options to management the chronic condition effectively.   As detailed above, the patient has a chronic significant painful condition that requires regular and detailed management.  The condition's impact on the patient's quality of life and health is substantial and necessitates a comprehensive and tailored approach   >> Conclusion   There were no barriers to communication. Informed patient that I would be available for any additional questions. Patient was instructed to call with any worsening symptoms including severe pain, new numbness/weakness, or changes in the bowel/bladder function. Discussed role of nsaids in pain management and all relevant risks, if patient is continuing to require after 4 weeks the patient should f/u for alternative treatment. Instructed patient to maintain pain diary to monitor pain level, mobility, and function.   The referring provider was informed of the above diagnosis and treatment plan.

## 2025-06-04 NOTE — CONSULT LETTER
[Dear  ___] : Dear  [unfilled], [Consult Letter:] : I had the pleasure of evaluating your patient, [unfilled]. [Please see my note below.] : Please see my note below. [Consult Closing:] : Thank you very much for allowing me to participate in the care of this patient.  If you have any questions, please do not hesitate to contact me. [Sincerely,] : Sincerely, [FreeTextEntry3] :   Yasmin Vera DO, MBA Director, [facility]  [facility] [employer] [facility]

## 2025-06-04 NOTE — ASSESSMENT
[FreeTextEntry1] : #Breast Cancer  - IDC - ER positive at 60%, W[place] positive at 80%, HER2 negative by IHC with a Ki-67 of 30%.  - Oncotype 21 - Since she is < 50  discussed that there is a ~6.5% benefit for RS 21-25. She wishes to proceed with chemotherapy - s/p dd AC-->T - Echo -Left ventricular cavity is normal in size. Left ventricular wall thickness is normal. Left ventricular systolic function is normal with an ejection fraction visually estimated at 55 to 60 %. There are no regional wall motion abnormalities seen. - did cold capping and cold gloves and socks - 5/30/24 -7/10/24 - ddAC Of note MammaPrint returned as  high risk 1 luminal type B breast cancer. This goes along with her Oncotype recurrence score of 21.  - 7/24/24- taxol. prior to cycle 4 +myalgias and pain worse in the back numbness to legs no saddle anesthesia dificulty walking.  - 9/25/24 C10 weekly taxol. +neuropathy. I had a lengthy discussion with patient regarding cumulative effect of chemo induced neuropathy. she is currently on gabapentin and percocet (for back pain). She states the neuropathy is the same and she wants to continue. I reviewed with her grading of neuropathy and if we need to hold tx or last 2 cycles. She states this is okay today and wishes to continue. Will proceed with caution. Patient did not want to increase gabapentin. Can try cymbalta as well. Cold soaks. Tahmina cold gloves and socks. Rad onc appt 10/2 - 11/2024 Held last two cycles taxol patient still has significant myalgias and neuropathy - Reviewed with patient role of tamoxifen alone vs with OFS she wishes to proceed with OFS.  - Zoladex 3.6mg monthly and on tamoxifen. continue  #Neuropathy  - Bought socks and gloves (cold) to wear during tx  - Cold soaks  - on gabapentin and cymbalta - follows with Dr. Martinez - paraneoplastic work up negative - check a1c.  #complains of neck pain and weakness in Upper extremities and significant back and hip pain reviewed MRI C/T/L spine and hips - no evidence of osseous mets check for autoimmune condition with diaz, sleroderma, sjogrens, lupus, RA  check for lyme seeing pain, Dr. Vera - if no releif will send to Dr. Hugo She has tried Medical marijuana at night  #DM - Metformin - Ozempic - Has not been taking steroids night before   #HTN - Olmesartan, hctz, amlodipine, metoprolol - Monitor at home  #vit D deficiency - Monitor levels   #Nausea  - Compazine prn   RTC in 2-3 mos with cbc with diff, cmp, LDH, ESR, CRP hormones

## 2025-06-04 NOTE — HISTORY OF PRESENT ILLNESS
[de-identified] : Ms. Mak is a 45-year-old premenopausal female that presents for initial consultation for newly diagnosed DCIS.   Oncological History:   3/1/24 s/p screening mammography and ultrasound at [facility] which showed a new right breast 2:00 density measuring about 1.4 cm and 10 cm from the nipple. There are also some benign-appearing cyst seen in the left breast 10:00 region.  3/18/24 s/p diagnostic imaging and then a right breast ultrasound-guided core biopsy was performed which showed an intermediate grade invasive duct cancer which was ER positive at 60%, OR positive at 80%, HER2 negative by IHC with a Ki-67 of 30%.   [employer] breast, ovarian, colon cancer panel was negative for any mutation.   24 s/p bilateral breast MRI at [facility] showing the localized cancer in the right breast 2:00 region 9 cm from the nipple with some surrounding non-mass like enhancement spanning 2.2 cm in the AP dimension.   24 s/p right breast partial mastectomy with localization device placement and sentinel lymph node biopsy. The final pathology showed a 1.8 cm high-grade invasive duct cancer which remained ER/OR positive HER2/juan negative with a Ki-67 between 40 and 50% and she had 2 negative sentinel lymph nodes and free margins making this a pathologic prognostic stage IA breast cancer.  Oncotype pending  Breast Cancer Risk Factors: Menarche: 9 Date of LMP: has not had menses in 15 y ears bc of hormonal IUD  Menopause:   Age at first live birth: 27 Nursed: yes Hysterectomy: no  Oophorectomy: no OCP:  She does have an IUD in place hormonal getting switched to copper next month with Dr. Arriaga HRT: no Related family history   She has a family history of breast cancer with her maternal grandmother who had breast cancer in her 50s and then a recurrence in her 70s. Her paternal grandmother had breast cancer in her 80s and also had lung cancer. She has a maternal aunt who had breast cancer in her 50s had and then cervical clinical cancer in her 60s. She has a paternal aunt with breast cancer at age 45. Ashkenazi: no BRCA testing: negative   Former smoker wuit 10 years ago smoked for 10 years     [de-identified] : Patient seen and examined in infusion today for follow up.  has significant neuropathy and myalgias.  complains of neck pain and weakness in Upper extremities and significant back and hip pain, seeing pain management today at 3:15pm will be seeing Dr. Solis- neurology, seeing tomorrow Has not been getting zoladex, last dose was in March 2025